# Patient Record
Sex: MALE | Race: BLACK OR AFRICAN AMERICAN | Employment: OTHER | URBAN - METROPOLITAN AREA
[De-identification: names, ages, dates, MRNs, and addresses within clinical notes are randomized per-mention and may not be internally consistent; named-entity substitution may affect disease eponyms.]

---

## 2017-01-12 ENCOUNTER — HOSPITAL ENCOUNTER (OUTPATIENT)
Dept: OTHER | Age: 67
Discharge: HOME | End: 2017-01-12
Attending: PSYCHIATRY & NEUROLOGY | Admitting: PSYCHIATRY & NEUROLOGY

## 2017-01-12 ENCOUNTER — OFFICE VISIT (OUTPATIENT)
Dept: NEUROLOGY | Age: 67
End: 2017-01-12

## 2017-01-12 VITALS
DIASTOLIC BLOOD PRESSURE: 67 MMHG | WEIGHT: 206.8 LBS | HEIGHT: 75 IN | HEART RATE: 51 BPM | SYSTOLIC BLOOD PRESSURE: 106 MMHG | BODY MASS INDEX: 25.71 KG/M2

## 2017-01-12 DIAGNOSIS — G60.9 IDIOPATHIC PERIPHERAL NEUROPATHY: Primary | ICD-10-CM

## 2017-01-12 LAB — VITAMIN B-12: 733 PG/ML (ref 211–911)

## 2017-01-12 PROCEDURE — 99204 OFFICE O/P NEW MOD 45 MIN: CPT | Performed by: PSYCHIATRY & NEUROLOGY

## 2017-01-12 RX ORDER — GABAPENTIN 300 MG/1
300 CAPSULE ORAL DAILY
COMMUNITY
End: 2018-04-30

## 2017-01-13 LAB
ALBUMIN SERPL-MCNC: 3.7 G/DL (ref 3.1–4.9)
ALPHA-1-GLOBULIN: 0.2 G/DL (ref 0.2–0.4)
ALPHA-2-GLOBULIN: 0.7 G/DL (ref 0.4–1.1)
BETA GLOBULIN: 1.1 G/DL (ref 0.9–1.6)
GAMMA GLOBULIN: 1.4 G/DL (ref 0.6–1.8)
SPE/IFE INTERPRETATION: NORMAL
TOTAL PROTEIN: 7.1 G/DL (ref 6.4–8.2)

## 2017-03-08 ENCOUNTER — OFFICE VISIT (OUTPATIENT)
Dept: ENT CLINIC | Age: 67
End: 2017-03-08

## 2017-03-08 VITALS
DIASTOLIC BLOOD PRESSURE: 60 MMHG | WEIGHT: 204 LBS | HEIGHT: 75 IN | HEART RATE: 57 BPM | BODY MASS INDEX: 25.36 KG/M2 | SYSTOLIC BLOOD PRESSURE: 107 MMHG

## 2017-03-08 DIAGNOSIS — J01.90 ACUTE RHINOSINUSITIS: Primary | ICD-10-CM

## 2017-03-08 PROCEDURE — 99214 OFFICE O/P EST MOD 30 MIN: CPT | Performed by: OTOLARYNGOLOGY

## 2017-03-08 RX ORDER — AMOXICILLIN AND CLAVULANATE POTASSIUM 875; 125 MG/1; MG/1
1 TABLET, FILM COATED ORAL 2 TIMES DAILY
Qty: 28 TABLET | Refills: 0 | Status: SHIPPED | OUTPATIENT
Start: 2017-03-08 | End: 2017-03-22

## 2017-04-07 ENCOUNTER — TELEPHONE (OUTPATIENT)
Dept: NEUROLOGY | Age: 67
End: 2017-04-07

## 2017-05-01 ENCOUNTER — TELEPHONE (OUTPATIENT)
Dept: ENT CLINIC | Age: 67
End: 2017-05-01

## 2017-05-05 ENCOUNTER — OFFICE VISIT (OUTPATIENT)
Dept: ENT CLINIC | Age: 67
End: 2017-05-05

## 2017-05-05 VITALS
HEIGHT: 75 IN | HEART RATE: 65 BPM | DIASTOLIC BLOOD PRESSURE: 63 MMHG | BODY MASS INDEX: 25.36 KG/M2 | SYSTOLIC BLOOD PRESSURE: 120 MMHG | WEIGHT: 204 LBS

## 2017-05-05 DIAGNOSIS — J01.90 ACUTE RHINOSINUSITIS: Primary | ICD-10-CM

## 2017-05-05 PROCEDURE — 99213 OFFICE O/P EST LOW 20 MIN: CPT | Performed by: OTOLARYNGOLOGY

## 2017-05-05 RX ORDER — AZITHROMYCIN 250 MG/1
TABLET, FILM COATED ORAL
Qty: 1 PACKET | Refills: 0 | Status: SHIPPED | OUTPATIENT
Start: 2017-05-13 | End: 2017-07-12

## 2017-05-05 RX ORDER — AZITHROMYCIN 250 MG/1
250 TABLET, FILM COATED ORAL DAILY
COMMUNITY
End: 2017-07-12

## 2017-07-18 ENCOUNTER — OFFICE VISIT (OUTPATIENT)
Dept: ORTHOPEDIC SURGERY | Age: 67
End: 2017-07-18

## 2017-07-18 VITALS
SYSTOLIC BLOOD PRESSURE: 119 MMHG | HEIGHT: 75 IN | WEIGHT: 199 LBS | HEART RATE: 76 BPM | DIASTOLIC BLOOD PRESSURE: 75 MMHG | TEMPERATURE: 97.6 F | BODY MASS INDEX: 24.74 KG/M2

## 2017-07-18 DIAGNOSIS — M17.12 ARTHRITIS OF LEFT KNEE: ICD-10-CM

## 2017-07-18 DIAGNOSIS — M71.22: ICD-10-CM

## 2017-07-18 DIAGNOSIS — M25.562 LEFT KNEE PAIN, UNSPECIFIED CHRONICITY: Primary | ICD-10-CM

## 2017-07-18 PROCEDURE — 99202 OFFICE O/P NEW SF 15 MIN: CPT | Performed by: PHYSICIAN ASSISTANT

## 2017-07-19 PROBLEM — M71.20: Status: ACTIVE | Noted: 2017-07-19

## 2017-07-19 PROBLEM — M17.12 ARTHRITIS OF LEFT KNEE: Status: ACTIVE | Noted: 2017-07-19

## 2017-09-29 ENCOUNTER — OFFICE VISIT (OUTPATIENT)
Dept: ENT CLINIC | Age: 67
End: 2017-09-29

## 2017-09-29 VITALS
WEIGHT: 201.6 LBS | BODY MASS INDEX: 25.07 KG/M2 | DIASTOLIC BLOOD PRESSURE: 59 MMHG | HEIGHT: 75 IN | HEART RATE: 63 BPM | SYSTOLIC BLOOD PRESSURE: 108 MMHG

## 2017-09-29 DIAGNOSIS — J32.0 CHRONIC MAXILLARY SINUSITIS: ICD-10-CM

## 2017-09-29 DIAGNOSIS — R09.82 POST-NASAL DRIP: Primary | ICD-10-CM

## 2017-09-29 PROCEDURE — 99214 OFFICE O/P EST MOD 30 MIN: CPT | Performed by: OTOLARYNGOLOGY

## 2017-09-29 RX ORDER — AZELASTINE 1 MG/ML
SPRAY, METERED NASAL
Qty: 1 BOTTLE | Refills: 2 | Status: SHIPPED | OUTPATIENT
Start: 2017-09-29 | End: 2018-04-30

## 2017-09-29 RX ORDER — FLUTICASONE PROPIONATE 50 MCG
SPRAY, SUSPENSION (ML) NASAL
Qty: 1 BOTTLE | Refills: 2 | Status: SHIPPED | OUTPATIENT
Start: 2017-09-29 | End: 2018-01-19

## 2017-09-29 RX ORDER — CEFDINIR 300 MG/1
600 CAPSULE ORAL DAILY
Qty: 42 CAPSULE | Refills: 0 | Status: SHIPPED | OUTPATIENT
Start: 2017-09-29 | End: 2017-10-20

## 2017-09-29 NOTE — PROGRESS NOTES
[address] [facility]          PCP:  Dorothy Ferrer MD      CHIEF COMPLAINT:  Chief Complaint   Patient presents with    Other     scratchy throat       HISTORY OF PRESENT ILLNESS:   Mulugeta Junior is a 73 y.o. male, here for a problem located in the nose. \"I have post nasal drainage, and a clogged nose and I have to blow it out, a lot of drainage, then my nose is cleared, and then I have to cough something out of the throat, I have to cough it up hard and it may irritate my throat and that may be where the scratchy throat comes from. \"  He denied pressure and pain in the face or sinus areas. These are not his typical sinus infection symptoms. Onset of current illness about two months ago. Previous episode in May treated with two Z-paks, it calmed it down a bit but didn't eliminate it. Since the last visit here, did well until two months ago. He has had a sinus infection once or twice a year for past three years. REVIEW OF SYSTEMS:   CONSTITUTIONAL:  Denied fever and chills. Denied unexplained weight loss. EARS, NOSE, THROAT:  Denied otorrhea, otalgia, epistaxis, nasal dyspnea, rhinorrhea, sore throat and hoarseness. PAST MEDICAL HISTORY:  Past Medical History:   Diagnosis Date    CAD (coronary artery disease)     Head pain cephalgia 1/21/2014    Hyperlipidemia     Hypertension     Perennial allergic rhinitis 2/11/2015    Pre-diabetes         Past Surgical History:   Procedure Laterality Date    CARDIAC SURGERY      Stents    CHOLECYSTECTOMY      COLONOSCOPY      CORONARY ANGIOPLASTY WITH STENT PLACEMENT      KNEE ARTHROSCOPY      right    ROTATOR CUFF REPAIR      right, and left    TONSILLECTOMY            Current Outpatient Prescriptions   Medication Sig Dispense Refill    fluticasone (FLONASE) 50 MCG/ACT nasal spray 2 sprays in each nostril daily, 15 minutes after morning dose of Astelin. Dipika Vasquez  1 Bottle 2    azelastine (ASTELIN) 0.1 % nasal spray Use 2 sprays Mery Mckinney / Carlos A Delarosa / Arnie Chavez / PROCEDURES:       Alyssa Doss was seen today for other. Diagnoses and all orders for this visit:    Post-nasal drip  -     fluticasone (FLONASE) 50 MCG/ACT nasal spray; 2 sprays in each nostril daily, 15 minutes after morning dose of Astelin. .  -     azelastine (ASTELIN) 0.1 % nasal spray; Use 2 sprays in each nostril 2 times daily. Use fluticasone 15 minutes after the morning dose of astelin. -     CT Sinus WO Contrast; Future    Chronic maxillary sinusitis  -     fluticasone (FLONASE) 50 MCG/ACT nasal spray; 2 sprays in each nostril daily, 15 minutes after morning dose of Astelin. Lorenso Frankel -     cefdinir (OMNICEF) 300 MG capsule; Take 2 capsules by mouth daily for 21 days  -     CT Sinus WO Contrast; Future         RECOMMENDATIONS / PLAN:    1. See Patient Instructions. 2. Return in about 1 month (around 10/29/2017) for recheck & diagnostic nasal endoscopy after CT scan.

## 2017-09-29 NOTE — MR AVS SNAPSHOT
people who are more muscular. If your body fat is high you can improve your BMI by decreasing your calorie intake and becoming more physically active. Learn more at: [employer]          Instructions    HOME INSTRUCTIONS - RHINOSINUSITIS CARE  · Take Probiotic as prescribed while you are taking antibiotics, to prevent diarrhea, stomach upset, pseudomembranous colitis, and C. difficile diarrhea. This may be obtained at your pharmacy or health food store. Alternatively, you may eat one cup of yogurt with active or live cultures twice daily while taking the antibiotic. Continue for two to three days after completion of the antibiotic. · Use a 12 hour decongestant spray, oxymetazoline (e.g. Afrin, Duration, 4-Way). Spray each nostril twice three times a day for three days, then two times a day for 2 days, and then stop for two days and then repeat the cycle once. · Take one Mucinex (blue box) maximum strength tablet 1200 mg every 12 hours, daily for the next 21 days. · Use a saline nasal/sinus irrigation system, e.g. NeilMed sinus rinse, twice daily to help to clear secretions and drainage. Use distilled water; DO NOT USE TAP WATER! This is because of the possibility of amoeba or other microorganisms in tap water, which can result in a fatal disease. Alternatively, you could use a blue bulb syringe and solution of 1/4 tsp of table salt in 8 ounces (one cup or 240 ml) of distilled water. · Use Flonase (fluticasone) and Astelin (azelastine) as prescribed daily. · It is important to take all your medications as prescribed. Please continue your antibiotics as prescribed.     · Please call the office if your condition worsens or if symptoms persist after treatment is completed.        ===================================================================      ADVERSE AND SIDE EFFECTS OF MEDICATIONS: Please be aware of the following possible adverse reactions, side effects, and complications of the following medications, including, but not limited to: allergic reaction, interactions with other medications, nausea, headache, diarrhea, persistent symptoms, failure to improve, and the following:     Omnicef (antibiotic):  diarrhea, colitis (severe infection and inflammation of the large intestine), pseudomembranous colitis (severe infection and inflammation of the colon, usually due to C. difficile bacteria)  yeast or fungal  infections, Nieto-Bravo syndrome (very rare necrotic skin reaction with peeling of skin, blisters and arthritis), persistent symptoms/infection, and failure to improve. Fluticasone:  nosebleed, burning sensation, atrophy of nasal mucosa, septal ulceration or perforation, nasal irritation, nasal yeast infection,  drowsiness, bad taste. Azelastine/Astelin:  Drowsiness, bad taste.    ~~~>>> Also please read all information given to you by the pharmacist.                 Today's Medication Changes          These changes are accurate as of: 9/29/17  4:18 PM.  If you have any questions, ask your nurse or doctor. START taking these medications           azelastine 0.1 % nasal spray   Commonly known as:  ASTELIN   Instructions:  Use 2 sprays in each nostril 2 times daily. Use fluticasone 15 minutes after the morning dose of astelin. Quantity:  1 Bottle   Refills:  2   Started by:  Henry Shone, MD       cefdinir 300 MG capsule   Commonly known as:  OMNICEF   Instructions: Take 2 capsules by mouth daily for 21 days   Quantity:  42 capsule   Refills:  0   Started by:  Henry Shone, MD       fluticasone 50 MCG/ACT nasal spray   Commonly known as:  FLONASE   Instructions:  2 sprays in each nostril daily, 15 minutes after morning dose of Astelin. .   Quantity:  1 Bottle   Refills:  2   Started by:  Henry Shone, MD            Where to Get Your Medications Hepatitis C screening is recommended for all adults regardless of risk factors born between [facility] at least once (lifetime) who have never been tested. 1950    HIV screening is recommended for all people regardless of risk factors  aged 15-65 years at least once (lifetime) who have never been HIV tested. 10/29/1966    Tetanus Combination Vaccine (1 - Tdap) 10/29/1970    Colonoscopy 10/29/2001    Zoster Vaccine 10/29/2011    Diabetes Screening 4/12/2016    Pneumococcal Vaccines (two) for all adults aged 72 and over (1 of 2 - PCV13) 10/29/2016    Yearly Flu Vaccine (1) 9/1/2017    Cholesterol Screening 10/12/2018            MyChart Signup           [employer] allows you to send messages to your doctor, view your test results, renew your prescriptions, schedule appointments, view visit notes, and more. How Do I Sign Up? 1. In your Internet browser, go to [email or web address]  2. Click on the Sign Up Now link in the Sign In box. You will see the New Member Sign Up page. 3. Enter your [employer] Access Code exactly as it appears below. You will not need to use this code after youve completed the sign-up process. If you do not sign up before the expiration date, you must request a new code. [employer] Access Code: [email or web address]  Expires: 11/28/2017  4:18 PM    4. Enter your Social Security Number ([id number]) and Date of Birth (mm/dd/yyyy) as indicated and click Submit. You will be taken to the next sign-up page. 5. Create a [employer] ID. This will be your [employer] login ID and cannot be changed, so think of one that is secure and easy to remember. 6. Create a [employer] password. You can change your password at any time. 7. Enter your Password Reset Question and Answer. This can be used at a later time if you forget your password. 8. Enter your e-mail address. You will receive e-mail notification when new information is available in [address] 9. Click Sign Up. You can now view your medical record. Additional Information  If you have questions, please contact the physician practice where you receive care. Remember, [employer] is NOT to be used for urgent needs. For medical emergencies, dial 911. For questions regarding your [employer] account call [phone]. If you have a clinical question, please call your doctor's office.

## 2017-10-30 ENCOUNTER — OFFICE VISIT (OUTPATIENT)
Dept: ENT CLINIC | Age: 67
End: 2017-10-30

## 2017-10-30 VITALS
HEART RATE: 63 BPM | SYSTOLIC BLOOD PRESSURE: 109 MMHG | DIASTOLIC BLOOD PRESSURE: 67 MMHG | WEIGHT: 200 LBS | BODY MASS INDEX: 25 KG/M2

## 2017-10-30 DIAGNOSIS — J32.0 CHRONIC MAXILLARY SINUSITIS: ICD-10-CM

## 2017-10-30 DIAGNOSIS — R09.82 POST-NASAL DRIP: Primary | ICD-10-CM

## 2017-10-30 PROCEDURE — 31231 NASAL ENDOSCOPY DX: CPT | Performed by: OTOLARYNGOLOGY

## 2017-10-30 NOTE — PROGRESS NOTES
PROCEDURE - Diagnostic Nasal Endoscopy, bilateral  [CPT 50425]    INFORMED CONSENT    Risks, benefits, and alternatives of diagnostic nasal endoscopy were explained to the patient. Specific mention was made of the risk of nosebleed, drainage, throat numbness with difficulty swallowing, and pain from the procedure. The patient gave oral consent to proceed. INDICATIONS/HISTORY  Chronic sinusitis. Feels improved with nasal sprays. FINDINGS    The nasal septum was mildly deviated to the right. Middle and inferior turbinates appeared to be normal bilaterally. There was no purulent exudate, polyp, mass, or lesion appreciated in either nasal cavity or middle meatus. The olfactory clefts appeared to be clear. The middle meatus appeared to be clear and patent bilaterally. The sphenoethmoid recesses appeared to be clear and patent. The remainder of the right and left nasal cavities appeared to be normal.         DESCRIPTION OF PROCEDURE    The nasal cavities were decongested and anesthetized with a mixture of equal parts of 0.05% oxymetazoline solution and 4% lidocaine solution by nasal sprayer. This was repeated after 5 minutes. After an appropriate elapse of time, the 4.4 mm 30 degree rigid nasal telescope was inserted into the left nasal cavity. Endoscopy of the inferior and middle meatus and nasal cavity to the posterior choana was performed with the above findings. The procedure was repeated on the right side with the above findings. The patient tolerated the procedure well with no evidence of complication. Instructions were given to avoid eating or drinking for 1 hour. IMPRESSION / Josh Morfin / Richmond Guo / PROCEDURES:       Ninfa Gusman was seen today for nasal congestion, nose problem and sinusitis. Diagnoses and all orders for this visit:    Post-nasal drip    Chronic maxillary sinusitis         RECOMMENDATIONS / PLAN:             1. See Patient Instructions.   2. Return in about 3

## 2017-11-07 ENCOUNTER — HOSPITAL ENCOUNTER (OUTPATIENT)
Dept: CT IMAGING | Age: 67
Discharge: HOME | End: 2017-11-07
Attending: OTOLARYNGOLOGY | Admitting: OTOLARYNGOLOGY

## 2017-11-07 DIAGNOSIS — R09.82 POST-NASAL DRIP: ICD-10-CM

## 2017-11-07 DIAGNOSIS — R09.82 POSTNASAL DRIP: ICD-10-CM

## 2017-11-07 DIAGNOSIS — J32.0 CHRONIC MAXILLARY SINUSITIS: ICD-10-CM

## 2018-01-19 ENCOUNTER — OFFICE VISIT (OUTPATIENT)
Dept: ENT CLINIC | Age: 68
End: 2018-01-19

## 2018-01-19 VITALS
BODY MASS INDEX: 25.36 KG/M2 | WEIGHT: 204 LBS | DIASTOLIC BLOOD PRESSURE: 65 MMHG | SYSTOLIC BLOOD PRESSURE: 109 MMHG | HEIGHT: 75 IN | HEART RATE: 57 BPM

## 2018-01-19 DIAGNOSIS — J01.90 ACUTE RHINOSINUSITIS: Primary | ICD-10-CM

## 2018-01-19 DIAGNOSIS — R09.82 POST-NASAL DRIP: ICD-10-CM

## 2018-01-19 PROCEDURE — 1036F TOBACCO NON-USER: CPT | Performed by: OTOLARYNGOLOGY

## 2018-01-19 PROCEDURE — G8427 DOCREV CUR MEDS BY ELIG CLIN: HCPCS | Performed by: OTOLARYNGOLOGY

## 2018-01-19 PROCEDURE — G8598 ASA/ANTIPLAT THER USED: HCPCS | Performed by: OTOLARYNGOLOGY

## 2018-01-19 PROCEDURE — 4040F PNEUMOC VAC/ADMIN/RCVD: CPT | Performed by: OTOLARYNGOLOGY

## 2018-01-19 PROCEDURE — 99214 OFFICE O/P EST MOD 30 MIN: CPT | Performed by: OTOLARYNGOLOGY

## 2018-01-19 PROCEDURE — 3017F COLORECTAL CA SCREEN DOC REV: CPT | Performed by: OTOLARYNGOLOGY

## 2018-01-19 PROCEDURE — G8484 FLU IMMUNIZE NO ADMIN: HCPCS | Performed by: OTOLARYNGOLOGY

## 2018-01-19 PROCEDURE — 1123F ACP DISCUSS/DSCN MKR DOCD: CPT | Performed by: OTOLARYNGOLOGY

## 2018-01-19 PROCEDURE — G8419 CALC BMI OUT NRM PARAM NOF/U: HCPCS | Performed by: OTOLARYNGOLOGY

## 2018-01-19 NOTE — PATIENT INSTRUCTIONS
SINUS RINSE  Increase your regular nasal-sinus rinse to once or twice daily (eg. NeilMed sinus rinse, OTC) using distilled water; DO NOT USE TAP WATER! This is because of the possibility of ameoba or other microorganisms in tap water, which can result in a fatal disease. Alternatively, you could use a blue bulb syringe and solution of 1/4 tsp of table salt in 8 ounces (one cup or 240 ml) of distilled water. HOME INSTRUCTIONS - RHINOSINUSITIS CARE  · Take Probiotic while you are taking antibiotics, to prevent diarrhea, stomach upset, pseudomembranous colitis, and C. difficile diarrhea. This may be obtained at your pharmacy or health food store. Alternatively, you may eat one cup of yogurt with active or live cultures twice daily while taking the antibiotic. Continue for two to three days after completion of the antibiotic. · Use a 12 hour decongestant spray, oxymetazoline (e.g. Afrin, Duration, 4-Way). Spray each nostril twice three times a day for three days, then two times a day for 2 days, and then stop for two days and then repeat the cycle once. · Take one Mucinex-D (red orange box) maximum strength tablet each morning and one Mucinex (blue box) maximum strength tablet each evening, about 12 hours later, daily for the next ten days. · A steam inhaler mask device may be helpful. These can be purchased at your pharmacy. · Use a saline nasal/sinus irrigation system, e.g. NeilMed sinus rinse, twice daily to help to clear secretions and drainage. Use distilled water; DO NOT USE TAP WATER! This is because of the possibility of amoeba or other microorganisms in tap water, which can result in a fatal disease. Alternatively, you could use a blue bulb syringe and solution of 1/4 tsp of table salt in 8 ounces (one cup or 240 ml) of distilled water. · Use fluticasone 2 sprays in each nostril once daily. · Use your astelin twice daily.   · It may take several days to several weeks for your sinusitis to clear up. It is important to take all your medications as prescribed. Please continue your antibiotics as prescribed. · Please call the office if your condition worsens or if symptoms persist after treatment is completed.          ===================================================================      ADVERSE AND SIDE EFFECTS OF MEDICATIONS:    Please be aware of the following possible adverse reactions, side effects, and complications of the following medications, including, but not limited to: allergic reaction, interactions with other medications, nausea, headache, diarrhea, persistent symptoms, failure to improve, and the following:     Omnicef (antibiotic):  diarrhea, colitis (severe infection and inflammation of the large intestine), pseudomembranous colitis (severe infection and inflammation of the colon, usually due to C. difficile bacteria)  yeast or fungal  infections, Nieto-Bravo syndrome (very rare necrotic skin reaction with peeling of skin, blisters and arthritis), persistent symptoms/infection, and failure to improve.      Fluticasone:  nosebleed, burning sensation, atrophy of nasal mucosa, septal ulceration or perforation, nasal irritation, nasal yeast infection,  drowsiness, bad taste.      ~~~>>> Also please read all information given to you by the pharmacist.

## 2018-01-22 ENCOUNTER — TELEPHONE (OUTPATIENT)
Dept: ENT CLINIC | Age: 68
End: 2018-01-22

## 2018-01-22 NOTE — TELEPHONE ENCOUNTER
The patient states that he was seen in the office and prescribed an antibiotic but the pharmacy did not receive it. Please advise.

## 2018-01-23 RX ORDER — AZITHROMYCIN 250 MG/1
TABLET, FILM COATED ORAL
Qty: 1 PACKET | Refills: 0 | Status: SHIPPED | OUTPATIENT
Start: 2018-01-23 | End: 2018-04-30

## 2018-01-23 NOTE — TELEPHONE ENCOUNTER
No.  But we can try a Z-reji since he had symptoms, but no real findings of sinus infection. E-script sent. Please notify patient.

## 2018-04-30 ENCOUNTER — OFFICE VISIT (OUTPATIENT)
Dept: ENT CLINIC | Age: 68
End: 2018-04-30

## 2018-04-30 VITALS — HEART RATE: 51 BPM | SYSTOLIC BLOOD PRESSURE: 134 MMHG | DIASTOLIC BLOOD PRESSURE: 74 MMHG

## 2018-04-30 DIAGNOSIS — J30.1 CHRONIC SEASONAL ALLERGIC RHINITIS DUE TO POLLEN: ICD-10-CM

## 2018-04-30 DIAGNOSIS — R20.8 BURNING SENSATION OF NOSE: Primary | ICD-10-CM

## 2018-04-30 DIAGNOSIS — R09.82 POST-NASAL DRIP: ICD-10-CM

## 2018-04-30 PROCEDURE — 4040F PNEUMOC VAC/ADMIN/RCVD: CPT | Performed by: OTOLARYNGOLOGY

## 2018-04-30 PROCEDURE — 1123F ACP DISCUSS/DSCN MKR DOCD: CPT | Performed by: OTOLARYNGOLOGY

## 2018-04-30 PROCEDURE — G8417 CALC BMI ABV UP PARAM F/U: HCPCS | Performed by: OTOLARYNGOLOGY

## 2018-04-30 PROCEDURE — G8427 DOCREV CUR MEDS BY ELIG CLIN: HCPCS | Performed by: OTOLARYNGOLOGY

## 2018-04-30 PROCEDURE — 3017F COLORECTAL CA SCREEN DOC REV: CPT | Performed by: OTOLARYNGOLOGY

## 2018-04-30 PROCEDURE — 99214 OFFICE O/P EST MOD 30 MIN: CPT | Performed by: OTOLARYNGOLOGY

## 2018-04-30 PROCEDURE — 1036F TOBACCO NON-USER: CPT | Performed by: OTOLARYNGOLOGY

## 2018-04-30 PROCEDURE — G8598 ASA/ANTIPLAT THER USED: HCPCS | Performed by: OTOLARYNGOLOGY

## 2018-04-30 RX ORDER — FEXOFENADINE HCL 180 MG/1
180 TABLET ORAL DAILY
Qty: 30 TABLET | Refills: 1 | Status: SHIPPED | OUTPATIENT
Start: 2018-04-30 | End: 2018-06-11

## 2018-04-30 RX ORDER — AZELASTINE HYDROCHLORIDE, FLUTICASONE PROPIONATE 137; 50 UG/1; UG/1
1 SPRAY, METERED NASAL 2 TIMES DAILY
Qty: 1 BOTTLE | Refills: 2 | Status: SHIPPED | OUTPATIENT
Start: 2018-04-30 | End: 2018-06-11

## 2018-05-11 ENCOUNTER — TELEPHONE (OUTPATIENT)
Dept: ENT CLINIC | Age: 68
End: 2018-05-11

## 2018-05-11 DIAGNOSIS — J32.0 CHRONIC MAXILLARY SINUSITIS: ICD-10-CM

## 2018-05-11 DIAGNOSIS — J30.1 CHRONIC SEASONAL ALLERGIC RHINITIS DUE TO POLLEN: Primary | ICD-10-CM

## 2018-05-11 DIAGNOSIS — R20.8 BURNING SENSATION OF NOSE: ICD-10-CM

## 2018-05-11 DIAGNOSIS — R09.82 POST-NASAL DRIP: ICD-10-CM

## 2018-05-14 RX ORDER — AZELASTINE 1 MG/ML
SPRAY, METERED NASAL
Qty: 1 BOTTLE | Refills: 2 | Status: SHIPPED | OUTPATIENT
Start: 2018-05-14 | End: 2018-06-11 | Stop reason: SDUPTHER

## 2018-05-14 RX ORDER — FLUTICASONE PROPIONATE 50 MCG
SPRAY, SUSPENSION (ML) NASAL
Qty: 1 BOTTLE | Refills: 2 | Status: SHIPPED | OUTPATIENT
Start: 2018-05-14 | End: 2018-06-11 | Stop reason: SDUPTHER

## 2018-06-11 ENCOUNTER — OFFICE VISIT (OUTPATIENT)
Dept: ENT CLINIC | Age: 68
End: 2018-06-11

## 2018-06-11 VITALS — HEART RATE: 56 BPM | SYSTOLIC BLOOD PRESSURE: 121 MMHG | DIASTOLIC BLOOD PRESSURE: 72 MMHG

## 2018-06-11 DIAGNOSIS — R09.82 POST-NASAL DRIP: ICD-10-CM

## 2018-06-11 DIAGNOSIS — J32.0 CHRONIC MAXILLARY SINUSITIS: ICD-10-CM

## 2018-06-11 DIAGNOSIS — J30.1 CHRONIC SEASONAL ALLERGIC RHINITIS DUE TO POLLEN: Primary | ICD-10-CM

## 2018-06-11 PROCEDURE — 1036F TOBACCO NON-USER: CPT | Performed by: OTOLARYNGOLOGY

## 2018-06-11 PROCEDURE — 4040F PNEUMOC VAC/ADMIN/RCVD: CPT | Performed by: OTOLARYNGOLOGY

## 2018-06-11 PROCEDURE — G8598 ASA/ANTIPLAT THER USED: HCPCS | Performed by: OTOLARYNGOLOGY

## 2018-06-11 PROCEDURE — G8427 DOCREV CUR MEDS BY ELIG CLIN: HCPCS | Performed by: OTOLARYNGOLOGY

## 2018-06-11 PROCEDURE — 3017F COLORECTAL CA SCREEN DOC REV: CPT | Performed by: OTOLARYNGOLOGY

## 2018-06-11 PROCEDURE — 99213 OFFICE O/P EST LOW 20 MIN: CPT | Performed by: OTOLARYNGOLOGY

## 2018-06-11 PROCEDURE — 1123F ACP DISCUSS/DSCN MKR DOCD: CPT | Performed by: OTOLARYNGOLOGY

## 2018-06-11 PROCEDURE — G8417 CALC BMI ABV UP PARAM F/U: HCPCS | Performed by: OTOLARYNGOLOGY

## 2018-06-11 RX ORDER — AZELASTINE HYDROCHLORIDE, FLUTICASONE PROPIONATE 137; 50 UG/1; UG/1
SPRAY, METERED NASAL
COMMUNITY
End: 2018-06-11 | Stop reason: SDUPTHER

## 2018-06-11 RX ORDER — HYDROCHLOROTHIAZIDE 25 MG/1
25 TABLET ORAL DAILY
COMMUNITY

## 2018-06-11 RX ORDER — AZELASTINE HYDROCHLORIDE, FLUTICASONE PROPIONATE 137; 50 UG/1; UG/1
1 SPRAY, METERED NASAL 2 TIMES DAILY
Qty: 1 BOTTLE | Refills: 5 | Status: SHIPPED | OUTPATIENT
Start: 2018-06-11 | End: 2019-03-19 | Stop reason: SDUPTHER

## 2018-06-11 RX ORDER — FEXOFENADINE HCL 180 MG/1
180 TABLET ORAL DAILY
Qty: 45 TABLET | Refills: 0 | Status: SHIPPED | COMMUNITY
Start: 2018-06-11 | End: 2019-03-19 | Stop reason: SDUPTHER

## 2018-06-29 ENCOUNTER — TELEPHONE (OUTPATIENT)
Dept: ENT CLINIC | Age: 68
End: 2018-06-29

## 2018-07-18 ENCOUNTER — OFFICE VISIT (OUTPATIENT)
Dept: ENT CLINIC | Age: 68
End: 2018-07-18

## 2018-07-18 VITALS
SYSTOLIC BLOOD PRESSURE: 114 MMHG | DIASTOLIC BLOOD PRESSURE: 67 MMHG | BODY MASS INDEX: 25.49 KG/M2 | HEIGHT: 75 IN | HEART RATE: 59 BPM | WEIGHT: 205 LBS

## 2018-07-18 DIAGNOSIS — J30.1 CHRONIC SEASONAL ALLERGIC RHINITIS DUE TO POLLEN: ICD-10-CM

## 2018-07-18 DIAGNOSIS — J02.9 ACUTE PHARYNGITIS, UNSPECIFIED ETIOLOGY: Primary | ICD-10-CM

## 2018-07-18 PROCEDURE — G8427 DOCREV CUR MEDS BY ELIG CLIN: HCPCS | Performed by: OTOLARYNGOLOGY

## 2018-07-18 PROCEDURE — 1123F ACP DISCUSS/DSCN MKR DOCD: CPT | Performed by: OTOLARYNGOLOGY

## 2018-07-18 PROCEDURE — 1101F PT FALLS ASSESS-DOCD LE1/YR: CPT | Performed by: OTOLARYNGOLOGY

## 2018-07-18 PROCEDURE — G8417 CALC BMI ABV UP PARAM F/U: HCPCS | Performed by: OTOLARYNGOLOGY

## 2018-07-18 PROCEDURE — 1036F TOBACCO NON-USER: CPT | Performed by: OTOLARYNGOLOGY

## 2018-07-18 PROCEDURE — 3017F COLORECTAL CA SCREEN DOC REV: CPT | Performed by: OTOLARYNGOLOGY

## 2018-07-18 PROCEDURE — G8598 ASA/ANTIPLAT THER USED: HCPCS | Performed by: OTOLARYNGOLOGY

## 2018-07-18 PROCEDURE — 99214 OFFICE O/P EST MOD 30 MIN: CPT | Performed by: OTOLARYNGOLOGY

## 2018-07-18 PROCEDURE — 4040F PNEUMOC VAC/ADMIN/RCVD: CPT | Performed by: OTOLARYNGOLOGY

## 2018-07-18 RX ORDER — AMOXICILLIN 875 MG/1
875 TABLET, COATED ORAL 2 TIMES DAILY
Qty: 20 TABLET | Refills: 0 | Status: SHIPPED | OUTPATIENT
Start: 2018-07-18 | End: 2018-07-28

## 2018-07-18 NOTE — PROGRESS NOTES
[address] [facility]       PCP:  Ivet Corbin MD      CHIEF COMPLAINT:  Chief Complaint   Patient presents with    Pharyngitis       HISTORY OF PRESENT ILLNESS:   Светлана Raya is a 78 y.o. male here today for evaluation of a problem located in the throat. He c/o throat hurting for about 3 weeks, bilateral, with no unilateral pain. The severity of the pain is at 4-5 on a 1-10 scale. \"It hurts more when I drink anything. \"  It occurs off and on, comes and goes for 5-10 years. It has been occurring \"2-3 times a year\" and lasts for \"about 5 - 6 weeks if it's not infection and I don't get medication. With medication, it goes away pretty fast.\"  \"I think it has something to do with the sinusitis. \"  He gets a sinus infection \"about once a year. \"  He has rhinorrhea, \"a lot, all the time\", not only with sore throat. REVIEW OF SYSTEMS:   CONSTITUTIONAL:  No fever no weight loss  EARS, NOSE, THROAT:  Denied otorrhea, otalgia, nasal dyspnea, sore throat and chronic hoarseness. ALLERGIC:  (+) seasonal allergies. Current Outpatient Prescriptions   Medication Sig Dispense Refill    amoxicillin (AMOXIL) 875 MG tablet Take 1 tablet by mouth 2 times daily for 10 days 20 tablet 0    hydrochlorothiazide (HYDRODIURIL) 25 MG tablet Take 25 mg by mouth      fexofenadine (ALLEGRA) 180 MG tablet Take 1 tablet by mouth daily 45 tablet 0    Azelastine-Fluticasone (DYMISTA) 137-50 MCG/ACT SUSP 1 spray by Each Nare route 2 times daily 1 Bottle 5    metFORMIN (GLUCOPHAGE) 500 MG tablet Take 500 mg by mouth 2 times daily (with meals)      simvastatin (ZOCOR) 40 MG tablet Take 40 mg by mouth nightly.  metoprolol (TOPROL-XL) 25 MG XL tablet Take 25 mg by mouth daily.  lisinopril (PRINIVIL;ZESTRIL) 10 MG tablet Take 20 mg by mouth daily.  aspirin 81 MG EC tablet Take 81 mg by mouth daily.  Psyllium (METAMUCIL PO) Take  by mouth daily.        No current facility-administered medications for this visit. EXAMINATION:      VITALS SIGNS reviewed. Vitals:    07/18/18 1737   BP: 114/67   Pulse: 59      GENERAL APPEARANCE:  Well developed, well nourished, no apparent distress. ABILITY TO COMMUNICATE/QUALITY OF VOICE:  Communicated without difficulty. Normal voice. No hot potato voice. No hoarseness. EXTERNAL EAR/NOSE:  Normal pinnae and mastoids. Normal external nose. EARS, OTOSCOPY: The TMs and EACs appeared to be normal bilaterally. NOSE:  The nasal septum was midline. The inferior turbinates were normal.  The nasal mucosa and secretions were normal.  No pus or polyps were seen. SINUSES:  The maxillary and frontal sinuses were nontender, bilaterally. LIPS, TEETH, AND GUMS:   Normal.     (+) OROPHARYNX/ORAL CAVITY:  mild erythema of post OP wall and lateral pharyngea bands. Otherwise, the oral mucosa, hard and soft palates, tongue, and pharynx were normal.      NECK:  No masses or tenderness. The laryngeal cartilages and hyoid bone were normal.  No abnormal appearance, asymmetry or abnormal tracheal position. PALPATION OF LYMPH NODES, CERVICAL, FACIAL AND SUPRACLAVICULAR:  No lymphadenopathy. ORIENTATION TO TIME, PLACE AND PERSON: Oriented x 3. MOOD AND AFFECT: Normal.  No evidence of depression, anxiety or agitation. IMPRESSION / Juliet Friday / Kenya Gross / PROCEDURES:       Светлана Raya was seen today for pharyngitis. Diagnoses and all orders for this visit:    Acute pharyngitis, unspecified etiology  -     amoxicillin (AMOXIL) 875 MG tablet; Take 1 tablet by mouth 2 times daily for 10 days    Chronic seasonal allergic rhinitis due to pollen         RECOMMENDATIONS / PLAN:    1. Continue allergy medications as needed. Return in about 6 months (around 1/18/2019) for recheck/follow-up, and sooner if condition worsens.

## 2019-03-19 ENCOUNTER — OFFICE VISIT (OUTPATIENT)
Dept: ENT CLINIC | Age: 69
End: 2019-03-19
Payer: MEDICARE

## 2019-03-19 VITALS
DIASTOLIC BLOOD PRESSURE: 68 MMHG | BODY MASS INDEX: 26.11 KG/M2 | WEIGHT: 210 LBS | HEIGHT: 75 IN | HEART RATE: 61 BPM | SYSTOLIC BLOOD PRESSURE: 113 MMHG

## 2019-03-19 DIAGNOSIS — J30.1 CHRONIC SEASONAL ALLERGIC RHINITIS DUE TO POLLEN: ICD-10-CM

## 2019-03-19 DIAGNOSIS — R09.82 POST-NASAL DRIP: ICD-10-CM

## 2019-03-19 DIAGNOSIS — J30.1 SEASONAL ALLERGIC RHINITIS DUE TO POLLEN: Primary | ICD-10-CM

## 2019-03-19 PROCEDURE — 3017F COLORECTAL CA SCREEN DOC REV: CPT | Performed by: OTOLARYNGOLOGY

## 2019-03-19 PROCEDURE — G8417 CALC BMI ABV UP PARAM F/U: HCPCS | Performed by: OTOLARYNGOLOGY

## 2019-03-19 PROCEDURE — 99213 OFFICE O/P EST LOW 20 MIN: CPT | Performed by: OTOLARYNGOLOGY

## 2019-03-19 PROCEDURE — 1036F TOBACCO NON-USER: CPT | Performed by: OTOLARYNGOLOGY

## 2019-03-19 PROCEDURE — 1123F ACP DISCUSS/DSCN MKR DOCD: CPT | Performed by: OTOLARYNGOLOGY

## 2019-03-19 PROCEDURE — 4040F PNEUMOC VAC/ADMIN/RCVD: CPT | Performed by: OTOLARYNGOLOGY

## 2019-03-19 PROCEDURE — G8427 DOCREV CUR MEDS BY ELIG CLIN: HCPCS | Performed by: OTOLARYNGOLOGY

## 2019-03-19 PROCEDURE — G8484 FLU IMMUNIZE NO ADMIN: HCPCS | Performed by: OTOLARYNGOLOGY

## 2019-03-19 PROCEDURE — 1101F PT FALLS ASSESS-DOCD LE1/YR: CPT | Performed by: OTOLARYNGOLOGY

## 2019-03-19 PROCEDURE — G8598 ASA/ANTIPLAT THER USED: HCPCS | Performed by: OTOLARYNGOLOGY

## 2019-03-19 RX ORDER — AZELASTINE HYDROCHLORIDE, FLUTICASONE PROPIONATE 137; 50 UG/1; UG/1
1 SPRAY, METERED NASAL 2 TIMES DAILY
Qty: 1 BOTTLE | Refills: 5 | Status: SHIPPED | OUTPATIENT
Start: 2019-03-19 | End: 2020-01-21

## 2019-03-19 RX ORDER — FEXOFENADINE HCL 180 MG/1
180 TABLET ORAL DAILY
Qty: 45 TABLET | Refills: 0 | Status: SHIPPED | OUTPATIENT
Start: 2019-03-19 | End: 2019-05-03 | Stop reason: SDUPTHER

## 2020-01-21 ENCOUNTER — OFFICE VISIT (OUTPATIENT)
Dept: ENT CLINIC | Age: 70
End: 2020-01-21
Payer: MEDICARE

## 2020-01-21 VITALS
DIASTOLIC BLOOD PRESSURE: 66 MMHG | WEIGHT: 197.1 LBS | SYSTOLIC BLOOD PRESSURE: 111 MMHG | BODY MASS INDEX: 24.51 KG/M2 | HEART RATE: 61 BPM | HEIGHT: 75 IN

## 2020-01-21 PROBLEM — M17.12 PRIMARY OSTEOARTHRITIS OF LEFT KNEE: Status: ACTIVE | Noted: 2017-09-06

## 2020-01-21 PROBLEM — N20.0 NEPHROLITHIASIS: Status: ACTIVE | Noted: 2018-06-07

## 2020-01-21 PROBLEM — S96.911A: Status: ACTIVE | Noted: 2019-07-31

## 2020-01-21 PROBLEM — G89.29 CHRONIC FOOT PAIN, RIGHT: Status: ACTIVE | Noted: 2019-07-31

## 2020-01-21 PROBLEM — K59.01 SLOW TRANSIT CONSTIPATION: Status: ACTIVE | Noted: 2017-01-17

## 2020-01-21 PROBLEM — M23.207: Status: ACTIVE | Noted: 2018-08-17

## 2020-01-21 PROBLEM — Z12.5 SCREENING FOR PROSTATE CANCER: Status: ACTIVE | Noted: 2019-02-19

## 2020-01-21 PROBLEM — S83.242A ACUTE MEDIAL MENISCUS TEAR OF LEFT KNEE: Status: ACTIVE | Noted: 2017-11-29
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
## 2020-01-21 PROBLEM — R31.29 OTHER MICROSCOPIC HEMATURIA: Status: ACTIVE | Noted: 2018-06-07

## 2020-01-21 PROBLEM — R63.4 UNINTENTIONAL WEIGHT LOSS: Status: ACTIVE | Noted: 2018-08-17

## 2020-01-21 PROBLEM — K29.60 BILIARY GASTRITIS: Status: ACTIVE | Noted: 2018-08-17

## 2020-01-21 PROBLEM — H83.02 ACUTE LABYRINTHITIS, LEFT: Status: ACTIVE | Noted: 2017-05-31

## 2020-01-21 PROBLEM — M47.812 CERVICAL SPONDYLOSIS: Status: ACTIVE | Noted: 2020-01-21

## 2020-01-21 PROBLEM — M79.671 CHRONIC FOOT PAIN, RIGHT: Status: ACTIVE | Noted: 2019-07-31

## 2020-01-21 PROBLEM — Z87.448 HISTORY OF URETHRAL STRICTURE: Status: ACTIVE | Noted: 2019-04-11

## 2020-01-21 PROCEDURE — 4040F PNEUMOC VAC/ADMIN/RCVD: CPT | Performed by: OTOLARYNGOLOGY

## 2020-01-21 PROCEDURE — G8427 DOCREV CUR MEDS BY ELIG CLIN: HCPCS | Performed by: OTOLARYNGOLOGY

## 2020-01-21 PROCEDURE — 1036F TOBACCO NON-USER: CPT | Performed by: OTOLARYNGOLOGY

## 2020-01-21 PROCEDURE — 99214 OFFICE O/P EST MOD 30 MIN: CPT | Performed by: OTOLARYNGOLOGY

## 2020-01-21 PROCEDURE — 1123F ACP DISCUSS/DSCN MKR DOCD: CPT | Performed by: OTOLARYNGOLOGY

## 2020-01-21 PROCEDURE — G8484 FLU IMMUNIZE NO ADMIN: HCPCS | Performed by: OTOLARYNGOLOGY

## 2020-01-21 PROCEDURE — 3017F COLORECTAL CA SCREEN DOC REV: CPT | Performed by: OTOLARYNGOLOGY

## 2020-01-21 PROCEDURE — G8420 CALC BMI NORM PARAMETERS: HCPCS | Performed by: OTOLARYNGOLOGY

## 2020-01-21 RX ORDER — METHYLPREDNISOLONE 4 MG/1
TABLET ORAL
Qty: 1 KIT | Refills: 0 | Status: SHIPPED | OUTPATIENT
Start: 2020-01-21 | End: 2020-08-19 | Stop reason: ALTCHOICE

## 2020-01-21 RX ORDER — FLUTICASONE PROPIONATE 50 MCG
SPRAY, SUSPENSION (ML) NASAL
Qty: 1 BOTTLE | Refills: 2 | Status: SHIPPED | OUTPATIENT
Start: 2020-01-21 | End: 2020-10-13 | Stop reason: ALTCHOICE

## 2020-01-21 NOTE — PROGRESS NOTES
[address] [facility]       PCP:  Gely Regan MD       CHIEF COMPLAINT:  Chief Complaint   Patient presents with    Nose Problem       HISTORY OF PRESENT ILLNESS:   Jerry Yip is a 77 y.o. male here today for evaluation and treatment of a problem located in the nose. The quality is \"burning when I sneeze. \"  The severity is 7 on a 1-10 scale. The duration is about two weeks. The timing is intermittent, mainly when I sneeze. Associated symptoms include lots of mucus in the nose and throat. No sinus pressure and pain. Had a sinus infection with a lot of mucus in nose and throat over New Years. REVIEW OF SYSTEMS:  CONSTITUTIONAL:  Denied fever. Denied unexplained weight loss, over 20 pounds in the past six months. EARS, NOSE, THROAT:  Denied otorrhea, otalgia, hearing loss, tinnitus, nasal dyspnea, rhinorrhea, sore throat and chronic hoarseness. PAST MEDICAL HISTORY:   Past Medical History:   Diagnosis Date    CAD (coronary artery disease)     Cervical spondylosis 1/21/2020    Chronic renal insufficiency, stage 2 (mild) 11/17/2016    Head pain cephalgia 1/21/2014    Hyperlipidemia     Hypertension     Perennial allergic rhinitis 2/11/2015    Pre-diabetes         Past Surgical History:   Procedure Laterality Date    CARDIAC SURGERY      Stents    CHOLECYSTECTOMY      COLONOSCOPY      CORONARY ANGIOPLASTY WITH STENT PLACEMENT      KNEE ARTHROSCOPY      right    ROTATOR CUFF REPAIR      right, and left    TONSILLECTOMY            EXAMINATION:       Vitals:    01/21/20 1257   BP: 111/66   Pulse: 61   Weight: 197 lb 1.6 oz (89.4 kg)   Height: 6' 3\" (1.905 m)     VITALS SIGNS: were reviewed. GENERAL APPEARANCE:  Well developed, well nourished, no apparent distress, no apparent deformities. ABILITY TO COMMUNICATE/QUALITY OF VOICE:  Communicated without difficulty. Normal voice.      INSPECTION, HEAD AND FACE: Normal overall appearance, with no scars, lesions or masses. SINUSES:  The maxillary and frontal sinuses were nontender, bilaterally. EXTERNAL EAR/NOSE:  Normal pinnae and mastoids. Normal external nose. EARS, OTOSCOPY:  The TMs and EACs appeared to be normal bilaterally. NOSE:  The nasal septum was midline. The inferior turbinates were normal.  The nasal mucosa and secretions were normal.  No pus or polyps were seen. LIPS, TEETH AND GUMS:  Unremarkable.   (+) OROPHARYNX/ORAL CAVITY:  Post tonsillectomy. Oral mucosa, hard and soft palates, tongue, and pharynx were normal.    NECK:  No masses or tenderness. No abnormal appearance, asymmetry or abnormal tracheal position. Laryngeal cartilages and hyoid bone were normal to palpation, with normal laryngeal crepitus. LYMPH NODES, CERVICAL, FACIAL AND SUPRACLAVICULAR:  No lymphadenopathy. THYROID:  No nodules, enlargement, tenderness or masses. CRANIAL NERVES:  II - XII intact, with no apparent deficits. IMPRESSION / Vicente [place] / [place] / PROCEDURES:       Pranav Molina was seen today for nose problem. Diagnoses and all orders for this visit:    Chronic rhinitis  -     fluticasone (FLONASE) 50 MCG/ACT nasal spray; 2 sprays in each nostril daily. -     methylPREDNISolone (MEDROL DOSEPACK) 4 MG tablet; Take by mouth, as directed by package instructions. Post-nasal drip    Phlegm in throat    Chronic seasonal allergic rhinitis due to pollen         RECOMMENDATIONS / PLAN:    Return if symptoms worsen or fail to clear with treatment, or, for any further Ear, Nose, Throat, or Sinus problems.          >>> Please note that this note was completed using Dragon voice recognition transcription. Every effort was made to ensure accuracy; however, inadvertent  transcription errors may be present despite my best efforts to edit errors.

## 2020-02-20 PROBLEM — Z12.5 SCREENING FOR PROSTATE CANCER: Status: RESOLVED | Noted: 2019-02-19 | Resolved: 2020-02-20

## 2020-08-19 ENCOUNTER — OFFICE VISIT (OUTPATIENT)
Dept: ENT CLINIC | Age: 70
End: 2020-08-19
Payer: MEDICARE

## 2020-08-19 VITALS — DIASTOLIC BLOOD PRESSURE: 75 MMHG | SYSTOLIC BLOOD PRESSURE: 119 MMHG | TEMPERATURE: 96.9 F | HEART RATE: 55 BPM

## 2020-08-19 PROCEDURE — 99214 OFFICE O/P EST MOD 30 MIN: CPT | Performed by: OTOLARYNGOLOGY

## 2020-08-19 PROCEDURE — G8427 DOCREV CUR MEDS BY ELIG CLIN: HCPCS | Performed by: OTOLARYNGOLOGY

## 2020-08-19 PROCEDURE — G8420 CALC BMI NORM PARAMETERS: HCPCS | Performed by: OTOLARYNGOLOGY

## 2020-08-19 PROCEDURE — 4040F PNEUMOC VAC/ADMIN/RCVD: CPT | Performed by: OTOLARYNGOLOGY

## 2020-08-19 PROCEDURE — 3017F COLORECTAL CA SCREEN DOC REV: CPT | Performed by: OTOLARYNGOLOGY

## 2020-08-19 PROCEDURE — 1123F ACP DISCUSS/DSCN MKR DOCD: CPT | Performed by: OTOLARYNGOLOGY

## 2020-08-19 PROCEDURE — 1036F TOBACCO NON-USER: CPT | Performed by: OTOLARYNGOLOGY

## 2020-08-19 RX ORDER — METHYLPREDNISOLONE 4 MG/1
TABLET ORAL
Qty: 1 KIT | Refills: 0 | Status: SHIPPED | OUTPATIENT
Start: 2020-08-19 | End: 2020-10-13 | Stop reason: ALTCHOICE

## 2020-08-19 RX ORDER — AZITHROMYCIN 250 MG/1
TABLET, FILM COATED ORAL
Qty: 2 PACKET | Refills: 0 | Status: SHIPPED | OUTPATIENT
Start: 2020-08-19 | End: 2020-10-13 | Stop reason: ALTCHOICE

## 2020-08-19 NOTE — PROGRESS NOTES
[facility]        PCP:  Ashely Kumar MD       CHIEF COMPLAINT:  Chief Complaint   Patient presents with    Pain     Sore throat. HISTORY OF PRESENT ILLNESS:   Marilin Canada is a 77 y.o. male here today for for evaluation and treatment of a problem located in the throat. The quality is sore throat. \"It feels like the sore throat I had before my stent. \"  The severity is 7-8 after exerting myself. The duration is one month. The timing is intermittent  comes and goes. \"Mainly when I'm exerting myself. If I'm not exerting myself, I hardly ever notice it. \"  He stated that he just recently had a heart evaluation, including stress test and angiogram, and all testing was negative and heart was okay. \"My  throat is sore, real sore, especially when exerting myself. It is in the middle of my throat. Heart okay stress test angiogram all okay      REVIEW OF SYSTEMS:  CONSTITUTIONAL:  Denied fever and chills. Denied unexplained weight loss, over 20 pounds in the past six months. EARS, NOSE, THROAT:  Denied otorrhea, otalgia, nasal dyspnea, rhinorrhea, and hoarseness. PAST MEDICAL HISTORY:   Past Medical History:   Diagnosis Date    CAD (coronary artery disease)     Cervical spondylosis 1/21/2020    Chronic renal insufficiency, stage 2 (mild) 11/17/2016    Head pain cephalgia 1/21/2014    Hyperlipidemia     Hypertension     Perennial allergic rhinitis 2/11/2015    Pre-diabetes         Past Surgical History:   Procedure Laterality Date    CARDIAC SURGERY      Stents    CHOLECYSTECTOMY      COLONOSCOPY      CORONARY ANGIOPLASTY WITH STENT PLACEMENT      KNEE ARTHROSCOPY      right    ROTATOR CUFF REPAIR      right, and left    TONSILLECTOMY            EXAMINATION:     Vitals:    08/19/20 1137   BP: 119/75   Pulse: 55   Temp: 96.9 °F (36.1 °C)      VITALS SIGNS: were reviewed.   GENERAL APPEARANCE: Well developed, well nourished, no apparent distress, no apparent deformities. ABILITY TO COMMUNICATE/QUALITY OF VOICE:  Communicated without difficulty. Normal voice. INSPECTION, HEAD AND FACE: Normal overall appearance, with no scars, lesions or masses. EYES:  Extraocular motion was intact, bilaterally. Normal primary gaze alignment. Sclera and conjunctiva clear bilaterally. SINUSES:  The maxillary and frontal sinuses were nontender, bilaterally. SALIVARY GLANDS:  The parotid, submandibular, and sublingual glands were normal bilaterally. EXTERNAL EAR/NOSE:  Normal pinnae and mastoids. Normal external nose. EARS, OTOSCOPY: The TMs and EACs appeared to be normal bilaterally. NOSE:  The nasal septum was midline. The inferior turbinates were normal.  The nasal mucosa and secretions were normal.  No pus or polyps were seen. LIPS, TEETH AND GUMS:  Unremarkable. OROPHARYNX/ORAL CAVITY:  Oral mucosa, hard and soft palates, tongue, and pharynx were normal.  NECK:  No masses or tenderness. No abnormal appearance, asymmetry or abnormal tracheal position. Laryngeal cartilages and hyoid bone were normal to palpation, with normal laryngeal crepitus. THYROID:  No nodules, enlargement, tenderness or masses. LYMPH NODES, CERVICAL, FACIAL AND SUPRACLAVICULAR:  No lymphadenopathy. IMPRESSION / Youlanda Cough / Devin Malick:       Jackie Quezada was seen today for pain. Diagnoses and all orders for this visit:    Throat pain in adult  -     azithromycin (ZITHROMAX Z-ENEDINA) 250 MG tablet; Take by mouth: 2 tablets on day 1, then 1 tab on day 2 to 5. Start 2nd enedina on day 10. Take 2 tablets on day 10, then 1 tab on day 11 to 15  -     methylPREDNISolone (MEDROL DOSEPACK) 4 MG tablet; Take by mouth, as directed by package instructions. RECOMMENDATIONS / PLAN:    Return if symptoms worsen or fail to clear up with treatment or, for any further Ear, Nose, Throat, or Sinus problems.

## 2020-08-20 ENCOUNTER — TELEPHONE (OUTPATIENT)
Dept: ENT CLINIC | Age: 70
End: 2020-08-20

## 2020-09-06 PROBLEM — R07.0 THROAT PAIN IN ADULT: Status: ACTIVE | Noted: 2020-09-06

## 2020-10-13 ENCOUNTER — OFFICE VISIT (OUTPATIENT)
Dept: ENT CLINIC | Age: 70
End: 2020-10-13
Payer: MEDICARE

## 2020-10-13 VITALS
SYSTOLIC BLOOD PRESSURE: 138 MMHG | TEMPERATURE: 97.1 F | WEIGHT: 200 LBS | HEART RATE: 57 BPM | DIASTOLIC BLOOD PRESSURE: 72 MMHG | BODY MASS INDEX: 25 KG/M2

## 2020-10-13 PROCEDURE — G8417 CALC BMI ABV UP PARAM F/U: HCPCS | Performed by: OTOLARYNGOLOGY

## 2020-10-13 PROCEDURE — G8427 DOCREV CUR MEDS BY ELIG CLIN: HCPCS | Performed by: OTOLARYNGOLOGY

## 2020-10-13 PROCEDURE — 1036F TOBACCO NON-USER: CPT | Performed by: OTOLARYNGOLOGY

## 2020-10-13 PROCEDURE — G8484 FLU IMMUNIZE NO ADMIN: HCPCS | Performed by: OTOLARYNGOLOGY

## 2020-10-13 PROCEDURE — 99213 OFFICE O/P EST LOW 20 MIN: CPT | Performed by: OTOLARYNGOLOGY

## 2020-10-13 PROCEDURE — 4040F PNEUMOC VAC/ADMIN/RCVD: CPT | Performed by: OTOLARYNGOLOGY

## 2020-10-13 PROCEDURE — 3017F COLORECTAL CA SCREEN DOC REV: CPT | Performed by: OTOLARYNGOLOGY

## 2020-10-13 PROCEDURE — 1123F ACP DISCUSS/DSCN MKR DOCD: CPT | Performed by: OTOLARYNGOLOGY

## 2020-10-13 RX ORDER — FLUTICASONE PROPIONATE 50 MCG
SPRAY, SUSPENSION (ML) NASAL
Qty: 1 BOTTLE | Refills: 2 | Status: SHIPPED | OUTPATIENT
Start: 2020-10-13 | End: 2021-07-15 | Stop reason: SDUPTHER

## 2020-10-13 RX ORDER — FEXOFENADINE HCL 180 MG/1
180 TABLET ORAL DAILY
Qty: 30 TABLET | Refills: 1 | Status: SHIPPED | OUTPATIENT
Start: 2020-10-13 | End: 2021-07-15 | Stop reason: SDUPTHER

## 2020-10-13 NOTE — PATIENT INSTRUCTIONS
MUCINEX THERAPY  Take one Mucinex (blue box) maximum strength 1200 mg tablet every 12 hours. SINUS RINSE  Do a nasal-sinus rinse two times daily (eg. NeilMed sinus rinse, OTC) using distilled water; DO NOT USE TAP WATER! This is because of the possibility of ameoba or other microorganisms in tap water, which can result in a fatal disease. Alternatively, you could use a blue bulb syringe and solution of 1/4 tsp of table salt in 8 ounces (one cup or 240 ml) of distilled water. ALLERGY MANAGEMENT  1. Continue Fluticasone/Flonase  2. Start Fexofenadine/Allegra.

## 2020-10-13 NOTE — PROGRESS NOTES
[facility]         PCP:  Lisa Davila MD      CHIEF COMPLAINT:  Chief Complaint   Patient presents with    Nasal Congestion     and throat congestion       HISTORY OF PRESENT ILLNESS:   Andrae More is a 77 y.o. male here for evaluation and treatment of recheck and follow-up of a problem located in the nose. The quality is nasal congestion. Clogged up in the nose. The severity is moderate. Associated symptoms include throat congestion, getting clogged up and sore from coughing up mucus, throat gets irritated. REVIEW OF SYSTEMS:   EARS, NOSE, THROAT:  Denied otorrhea, otalgia, hearing loss, tinnitus, rhinorrhea, sore throat and chronic hoarseness. EXAMINATION:      Vitals:    10/13/20 1500   BP: 138/72   Pulse: 57   Temp: 97.1 °F (36.2 °C)   Weight: 200 lb (90.7 kg)      VITALS SIGNS were reviewed. GENERAL APPEARANCE:  Well developed, well nourished, no apparent distress, no apparent deformities. ABILITY TO COMMUNICATE/QUALITY OF VOICE:  Communicated without difficulty. Normal voice. No hot potato voice. No hoarseness. EXTERNAL EAR/NOSE:  Normal pinnae and mastoids. Normal external nose. EARS, OTOSCOPY: The TMs and EACs appeared to be normal bilaterally. NOSE:  The nasal septum was midline. The inferior turbinates were normal.  The nasal mucosa and secretions were normal.  No pus or polyps were seen. SINUSES:  The maxillary and frontal sinuses were nontender, bilaterally. OROPHARYNX/ORAL CAVITY:  Oral mucosa, hard and soft palates, tongue, and pharynx were normal.      NECK:  No masses or tenderness. The laryngeal cartilages and hyoid bone were normal, with normal laryngeal crepitus. No abnormal appearance, asymmetry or abnormal tracheal position. PALPATION OF LYMPH NODES, CERVICAL, FACIAL AND SUPRACLAVICULAR:  No lymphadenopathy.           Boby Cantrell / Maico Muro / Lisa Salguero:       Andrae More was seen today for nasal congestion. Diagnoses and all orders for this visit:    Seasonal allergic rhinitis due to pollen  -     fluticasone (FLONASE) 50 MCG/ACT nasal spray; 2 sprays in each nostril daily. -     fexofenadine (ALLEGRA) 180 MG tablet; Take 1 tablet by mouth daily    Chronic rhinitis  -     fluticasone (FLONASE) 50 MCG/ACT nasal spray; 2 sprays in each nostril daily. -     fexofenadine (ALLEGRA) 180 MG tablet; Take 1 tablet by mouth daily         RECOMMENDATIONS / PLAN:    1. See patient instructions on file for this visit, which were fully discussed with the patient. 2. Return if symptoms worsen or fail to improve wth treatment, or, for any further ENT or sinus problems or symptoms. Mario Astudillo

## 2020-11-02 PROBLEM — J30.1 SEASONAL ALLERGIC RHINITIS DUE TO POLLEN: Status: ACTIVE | Noted: 2020-11-02

## 2021-07-15 ENCOUNTER — OFFICE VISIT (OUTPATIENT)
Dept: ENT CLINIC | Age: 71
End: 2021-07-15
Payer: MEDICARE

## 2021-07-15 VITALS — DIASTOLIC BLOOD PRESSURE: 66 MMHG | SYSTOLIC BLOOD PRESSURE: 122 MMHG | HEART RATE: 53 BPM

## 2021-07-15 DIAGNOSIS — J01.90 ACUTE RHINOSINUSITIS: Primary | ICD-10-CM

## 2021-07-15 DIAGNOSIS — J30.1 SEASONAL ALLERGIC RHINITIS DUE TO POLLEN: Chronic | ICD-10-CM

## 2021-07-15 DIAGNOSIS — J31.0 CHRONIC RHINITIS: Chronic | ICD-10-CM

## 2021-07-15 PROCEDURE — 1123F ACP DISCUSS/DSCN MKR DOCD: CPT | Performed by: OTOLARYNGOLOGY

## 2021-07-15 PROCEDURE — G8427 DOCREV CUR MEDS BY ELIG CLIN: HCPCS | Performed by: OTOLARYNGOLOGY

## 2021-07-15 PROCEDURE — 1036F TOBACCO NON-USER: CPT | Performed by: OTOLARYNGOLOGY

## 2021-07-15 PROCEDURE — G8417 CALC BMI ABV UP PARAM F/U: HCPCS | Performed by: OTOLARYNGOLOGY

## 2021-07-15 PROCEDURE — 3017F COLORECTAL CA SCREEN DOC REV: CPT | Performed by: OTOLARYNGOLOGY

## 2021-07-15 PROCEDURE — 4040F PNEUMOC VAC/ADMIN/RCVD: CPT | Performed by: OTOLARYNGOLOGY

## 2021-07-15 PROCEDURE — 99213 OFFICE O/P EST LOW 20 MIN: CPT | Performed by: OTOLARYNGOLOGY

## 2021-07-15 RX ORDER — CEFDINIR 300 MG/1
600 CAPSULE ORAL DAILY
Qty: 28 CAPSULE | Refills: 0 | Status: SHIPPED | OUTPATIENT
Start: 2021-07-15 | End: 2021-07-29

## 2021-07-15 RX ORDER — FLUTICASONE PROPIONATE 50 MCG
SPRAY, SUSPENSION (ML) NASAL
Qty: 1 BOTTLE | Refills: 2 | Status: SHIPPED | OUTPATIENT
Start: 2021-07-15 | End: 2022-07-25 | Stop reason: ALTCHOICE

## 2021-07-15 RX ORDER — FEXOFENADINE HCL 180 MG/1
180 TABLET ORAL DAILY
Qty: 30 TABLET | Refills: 1 | Status: SHIPPED | OUTPATIENT
Start: 2021-07-15 | End: 2022-07-25 | Stop reason: ALTCHOICE

## 2021-07-15 ASSESSMENT — ENCOUNTER SYMPTOMS
SORE THROAT: 1
RHINORRHEA: 1

## 2021-07-15 NOTE — PROGRESS NOTES
Left salivary gland is not diffusely enlarged or tender. Right Ear: Tympanic membrane, ear canal and external ear normal.      Left Ear: Tympanic membrane, ear canal and external ear normal.      Nose: Mucosal edema (and erythema bilaterally) and congestion present. No nasal deformity, septal deviation or rhinorrhea. Right Turbinates: Not enlarged. Left Turbinates: Not enlarged. Right Sinus: No maxillary sinus tenderness or frontal sinus tenderness. Left Sinus: No maxillary sinus tenderness or frontal sinus tenderness. Mouth/Throat:      Lips: Pink. No lesions. Mouth: Mucous membranes are moist. No oral lesions. Tongue: No lesions. Palate: No mass and lesions. Pharynx: Oropharynx is clear. Uvula midline. No oropharyngeal exudate or posterior oropharyngeal erythema. Tonsils: No tonsillar exudate or tonsillar abscesses. Neck:      Thyroid: No thyroid mass, thyromegaly or thyroid tenderness. Trachea: No tracheal deviation. Musculoskeletal:      Cervical back: Normal range of motion and neck supple. No rigidity. No muscular tenderness. Lymphadenopathy:      Cervical: No cervical adenopathy. Neurological:      Mental Status: He is alert. Adamaris Berman / More Acuña / Gatito Bernstein was seen today for sinusitis. Diagnoses and all orders for this visit:    Acute rhinosinusitis  -     fexofenadine (ALLEGRA) 180 MG tablet; Take 1 tablet by mouth daily  -     fluticasone (FLONASE) 50 MCG/ACT nasal spray; 2 sprays in each nostril daily. -     cefdinir (OMNICEF) 300 MG capsule; Take 2 capsules by mouth daily for 14 days Take both capsules together at the same time, once daily. Seasonal allergic rhinitis due to pollen  -     fexofenadine (ALLEGRA) 180 MG tablet; Take 1 tablet by mouth daily  -     fluticasone (FLONASE) 50 MCG/ACT nasal spray; 2 sprays in each nostril daily. Chronic rhinitis  -     fexofenadine (ALLEGRA) 180 MG tablet;  Take 1 tablet by mouth daily  -     fluticasone (FLONASE) 50 MCG/ACT nasal spray; 2 sprays in each nostril daily. RECOMMENDATIONS/PLAN      1. Acetaminophen (eg. Tylenol) (over the counter medications) as needed for pain. 2. See patient instructions on file for this visit. 3. Return if symptoms worsen or fail to clear up with treatment or, for any further ENT or sinus problems or symptoms.           MEDICAL DECISION MAKING    # and complexity of problems addressed:  14706 - Low     1 stable chronic illness  1 acute, uncomplicated illness or injury     Amount and/or Complexity of Data to be Reviewed and Analyzed  76772 - Straightforward  no data or only 1 test or document reviewed or ordered    Risk of Complications and /or Morbidity or Mortality of Patient Management  08402 - Moderate  Prescription drug management

## 2021-07-15 NOTE — PATIENT INSTRUCTIONS
with other medications, nausea, headache, diarrhea, persistent symptoms, failure to improve, and the following:     Cefdinir (antibiotic):  diarrhea, colitis (severe infection and inflammation of the large intestine), pseudomembranous colitis (severe infection and inflammation of the colon, usually due to C. difficile bacteria)  yeast or fungal  infections, Nieto-Bravo syndrome (very rare necrotic skin reaction with peeling of skin, blisters and arthritis), persistent symptoms/infection, and failure to improve. Fluticasone:  nosebleed, burning sensation, atrophy of nasal mucosa, septal ulceration or perforation, nasal irritation, nasal yeast infection,  drowsiness, bad taste.      ~~~>>> Also please read the medication information in this AVS and all information given to you by the pharmacist.         Patient Education        cefdinir  Pronunciation:  LAWRENCE sullivan  Brand:  Neymar Haines Omni-Pac  What is the most important information I should know about cefdinir? Do not take this medicine if you are allergic to cefdinir, or to similar antibiotics, such as Ceftin, Cefzil, Keflex, and others. What is cefdinir? Cefdinir is a cephalosporin (SEF a low spor in) antibiotic that is used to treat many different types of infections caused by bacteria. Cefdinir may also be used for purposes not listed in this medication guide. What should I discuss with my healthcare provider before taking cefdinir? You should not take this medicine if you are allergic to cefdinir or any other cephalosporin antibiotic (cefadroxil, cefprozil, cefazolin, cefalexin, Keflex, and others). Tell your doctor if you have ever had:  · kidney disease (or if you are on dialysis);  · intestinal problems, such as colitis; or  · an allergy to any drugs (especially penicillins). Cefdinir liquid contains sucrose. Talk to your doctor before using this form of cefdinir if you have diabetes.   Tell your doctor if you are pregnant or reaction (fever, sore throat, burning eyes, skin pain, red or purple skin rash with blistering and peeling). Call your doctor at once if you have:  · severe stomach pain, diarrhea that is watery or bloody (even if it occurs months after your last dose);  · fever, chills, body aches, flu symptoms;  · pale skin, easy bruising, unusual bleeding;  · seizure (convulsions);  · fever, weakness, confusion;  · dark colored urine, jaundice (yellowing of the skin or eyes); or  · kidney problems --little or no urination, swelling in your feet or ankles, feeling tired or short of breath. Common side effects may include:  · nausea, vomiting, stomach pain, diarrhea;  · vaginal itching or discharge;  · headache; or  · rash (including diaper rash in an infant taking liquid cefdinir. This is not a complete list of side effects and others may occur. Call your doctor for medical advice about side effects. You may report side effects to FDA at 3-253-FDA-1609. What other drugs will affect cefdinir? Tell your doctor about all your other medicines, especially:  · probenecid; or  · vitamin or mineral supplements that contain iron. This list is not complete. Other drugs may affect cefdinir, including prescription and over-the-counter medicines, vitamins, and herbal products. Not all possible drug interactions are listed here. Where can I get more information? Your pharmacist can provide more information about cefdinir. Remember, keep this and all other medicines out of the reach of children, never share your medicines with others, and use this medication only for the indication prescribed. Every effort has been made to ensure that the information provided by [address] is accurate, up-to-date, and complete, but no guarantee is made to that effect. Drug information contained herein may be time sensitive.  [employer] information has been compiled for use by healthcare practitioners and consumers in the [place] and therefore [facility] does not warrant that uses outside of the [place] are appropriate, unless specifically indicated otherwise. [facility]'s drug information does not endorse drugs, diagnose patients or recommend therapy. [facility] drug information is an informational resource designed to assist licensed healthcare practitioners in caring for their patients and/or to serve consumers viewing this service as a supplement to, and not a substitute for, the expertise, skill, knowledge and judgment of healthcare practitioners. The absence of a warning for a given drug or drug combination in no way should be construed to indicate that the drug or drug combination is safe, effective or appropriate for any given patient. [facility] does not assume any responsibility for any aspect of healthcare administered with the aid of information [facility] provides. The information contained herein is not intended to cover all possible uses, directions, precautions, warnings, drug interactions, allergic reactions, or adverse effects. If you have questions about the drugs you are taking, check with your doctor, nurse or pharmacist.  Copyright 6078-4992 [address] [place] [address] Dr ROUSE. Version: 7.03. Revision date: 1/4/2021. Care instructions adapted under license by [facility] ([facility]). If you have questions about a medical condition or this instruction, always ask your healthcare professional. [place] [address] any warranty or liability for your use of this information. Patient Education        fexofenadine  Pronunciation:  FEX oh FEN a manoj  Brand:  Allegra, Aller-Ease, Children's Allegra Allergy, Mucinex Allergy, Wal-Fex  What is the most important information I should know about fexofenadine? Do not take fexofenadine with fruit juice (such as apple, orange, or grapefruit). What is fexofenadine? Fexofenadine is an antihistamine that is used to treat the symptoms of seasonal allergies (hay fever) in adults and children.   Fexofenadine is also used to treat skin itching and hives caused by a condition called chronic idiopathic urticaria in adults and children at least 10years old. There are many brands and forms of fexofenadine available. Not all brands are listed on this leaflet. Fexofenadine may also be used for purposes not listed in this medication guide. What should I discuss with my healthcare provider before taking fexofenadine? You should not use fexofenadine if you are allergic to it. Ask a doctor or pharmacist if this medicine is safe to use if you have:  · kidney disease. Ask a doctor before using this medicine if you are pregnant or breastfeeding. Older adults may be more sensitive to the effects of this medicine. If you are 72 or older, ask a doctor before taking fexofenadine. This medicine may contain phenylalanine. Tell your doctor if you have phenylketonuria (PKU). How should I take fexofenadine? Use exactly as directed on the label, or as prescribed by your doctor. Always follow directions on the medicine label about giving cough or cold medicine to a child. · Ask a doctor before giving fexofenadine liquid to a child younger than 3years old. · The disintegrating (melting) tablets are not for use in a child younger than 10years old. · The 12-hour and 24-hour forms  of fexofenadine are not for use in a child younger than 15years old. Take this medicine only with water. Take the disintegrating tablet on an empty stomach. Shake the oral suspension (liquid) before you measure a dose. Use the dosing cup provided, or use a medicine dose-measuring device (not a kitchen spoon). Remove an orally disintegrating tablet from the package only when you are ready to take the medicine. Place the tablet on your tongue and allow it to dissolve, without chewing. Swallow several times as the tablet dissolves. Call your doctor if your symptoms do not improve, or if they get worse.   Store fexofenadine in its original package at room temperature, away from moisture and heat. Do not allow liquid medicine to freeze. What happens if I miss a dose? Since allergy medicine is used when needed, you may not be on a dosing schedule. Skip any missed dose if it's almost time for your next dose. Do not use two doses at one time. What happens if I overdose? Seek emergency medical attention or call the Poison Help line at 1-234.208.3213. Overdose symptoms may include dry mouth, dizziness, or drowsiness. What should I avoid while taking fexofenadine? Do not take fexofenadine with fruit juice (such as apple, orange, or grapefruit). These juices can make it harder for your body to absorb fexofenadine. Avoid taking an antacid within 2 hours before or after you take fexofenadine. Certain antacids can make it harder for your body to absorb this medicine. Ask a doctor or pharmacist before using other cough or cold medicines that may contain similar ingredients. What are the possible side effects of fexofenadine? Get emergency medical help if you have signs of an allergic reaction: hives; difficult breathing; swelling of your face, lips, tongue, or throat. Stop using fexofenadine and call your doctor at once if you have:  · flu-like symptoms (fever, chills, unusual tiredness);  · new or worsening cough;  · pain; or  · signs of an ear infection --fever, ear pain or full feeling, trouble hearing, drainage from the ear, fussiness in a child. Common side effects may include:  · headache;  · back pain; or  · cold symptoms such as stuffy nose, sinus pain, sore throat. This is not a complete list of side effects and others may occur. Call your doctor for medical advice about side effects. You may report side effects to FDA at 8-592-PCU-2529. What other drugs will affect fexofenadine? Using fexofenadine with other drugs that make you drowsy can worsen this effect.  Ask your doctor before using opioid medication, a sleeping pill, a muscle relaxer, or medicine for anxiety or seizures. Ask a doctor or pharmacist before using fexofenadine with any other medications, especially:  · ketoconazole; or  · erythromycin. This list is not complete. Other drugs may affect fexofenadine, including prescription and over-the-counter medicines, vitamins, and herbal products. Not all possible drug interactions are listed here. Where can I get more information? Your pharmacist can provide more information about fexofenadine. Remember, keep this and all other medicines out of the reach of children, never share your medicines with others, and use this medication only for the indication prescribed. Every effort has been made to ensure that the information provided by [facility]  is accurate, up-to-date, and complete, but no guarantee is made to that effect. Drug information contained herein may be time sensitive. [facility] information has been compiled for use by healthcare practitioners and consumers in the [place] and therefore [employer] does not warrant that uses outside of the [place] are appropriate, unless specifically indicated otherwise. [facility]'s drug information does not endorse drugs, diagnose patients or recommend therapy. [facility] drug information is an informational resource designed to assist licensed healthcare practitioners in caring for their patients and/or to serve consumers viewing this service as a supplement to, and not a substitute for, the expertise, skill, knowledge and judgment of healthcare practitioners. The absence of a warning for a given drug or drug combination in no way should be construed to indicate that the drug or drug combination is safe, effective or appropriate for any given patient. [facility] does not assume any responsibility for any aspect of healthcare administered with the aid of information [facility] provides.  The information contained herein is not intended to cover all possible uses, directions, precautions, warnings, drug interactions, allergic reactions, or adverse effects. If you have questions about the drugs you are taking, check with your doctor, nurse or pharmacist.  Copyright 6274-8199 [address]: 11.01. Revision date: 5/1/2019. Care instructions adapted under license by [facility] ([facility]). If you have questions about a medical condition or this instruction, always ask your healthcare professional. [place] [address] any warranty or liability for your use of this information. Patient Education        fluticasone nasal  Pronunciation:  floo TIK a sone  Brand:  Flonase, Veramyst, Kelsy Na  What is the most important information I should know about fluticasone nasal?  Follow all directions on your medicine label and package. Tell each of your healthcare providers about all your medical conditions, allergies, and all medicines you use. What is fluticasone nasal?  Fluticasone nasal (for the nose) is a steroid medicine that is used to treat nasal congestion, sneezing, runny nose, and itchy or watery eyes caused by seasonal or year-round allergies. The Kelsy Na brand of this medicine is for use only in adults. Veramyst may be used in children as young as 3years old. Flonase is for use in adults and children who are at least 3years old. Fluticasone nasal may also be used for purposes not listed in this medication guide. What should I discuss with my healthcare provider before using fluticasone nasal?  You should not use fluticasone nasal if you are allergic to it. Fluticasone can weaken your immune system,  making it easier for you to get an infection or worsening an infection you already have or recently had. Tell your doctor about any illness or infection you have had within the past several weeks.   Tell your doctor if you have ever had:  · sores or ulcers inside your nose;  · injury of or surgery on your nose;  · glaucoma or cataracts;  · liver disease;  · diabetes;  · a weak immune system; or  · any type of infection (bacterial, fungal, viral, or parasitic). If you use fluticasone nasal without a prescription and you have any medical conditions, ask a doctor or pharmacist if this medicine is safe for you. Tell your doctor if you are pregnant or breast-feeding. How should I use fluticasone nasal?  Follow all directions on your prescription label and read all medication guides or instruction sheets. Use the medicine exactly as directed. Do not share this medicine with another person, even if they have the same symptoms you have. Your dose will depend on the fluticasone brand or strength you use, and your dose may change once your symptoms improve. Follow all dosing instructions very carefully. A child using the nasal spray should be supervised by an adult. Read and carefully follow any Instructions for Use provided with your medicine. Ask your doctor or pharmacist if you do not understand these instructions. Shake the nasal spray just before each use. If you switched to fluticasone from another steroid medicine, you should not stop using it suddenly. Follow your doctor's instructions about tapering your dose. It may take several days before your symptoms improve. Keep using the medication as directed and tell your doctor if your symptoms do not improve after a week of treatment. Store fluticasone nasal in an upright position at room temperature, away from moisture and heat. Throw the spray bottle away after you have used 120 sprays, even if there is still medicine left in the bottle. What happens if I miss a dose? Use the medicine as soon as you can, but skip the missed dose if it is almost time for your next dose. Do not use two doses at one time. What happens if I overdose? Seek emergency medical attention or call the Poison Help line at 1-214.463.4500. An overdose of fluticasone nasal is not expected to produce life threatening symptoms.  Long term use of steroid medicine can lead to glaucoma, cataracts, thinning skin, easy bruising, changes in body fat (especially in your face, neck, back, and waist), increased acne or facial hair, menstrual problems, impotence, or loss of interest in sex. What should I avoid while using fluticasone nasal?  Avoid getting the spray in your eyes or mouth. If this does happen, rinse with water. Avoid being near people who are sick or have infections. Call your doctor for preventive treatment if you are exposed to chickenpox or measles. These conditions can be serious or even fatal in people who are using fluticasone nasal.  What are the possible side effects of fluticasone nasal?  Get emergency medical help if you have signs of an allergic reaction: hives, rash; feeling light-headed; difficult breathing; swelling of your face, lips, tongue, or throat. Call your doctor at once if you have:  · severe or ongoing nosebleeds;  · noisy breathing, runny nose, or crusting around your nostrils;  · redness, sores, or white patches in your mouth or throat;  · fever, chills, body aches;  · blurred vision, eye pain, or seeing halos around lights;  · any wound that will not heal; or  · signs of a hormonal disorder --worsening tiredness or muscle weakness, feeling light-headed, nausea, vomiting. Steroid medicine can affect growth in children. Tell your doctor if your child is not growing at a normal rate while using this medicine. Common side effects may include:  · minor nosebleed, burning or itching in your nose;  · sores or white patches inside or around your nose;  · cough, trouble breathing;  · headache, back pain;  · sinus pain, sore throat, fever; or  · nausea, vomiting. This is not a complete list of side effects and others may occur. Call your doctor for medical advice about side effects. You may report side effects to FDA at 0-503-FBD-1725.   What other drugs will affect fluticasone nasal?  Tell your doctor about all your other medicines, especially:  · antifungal medicine; or  · antiviral medicine to treat hepatis C or HIV/AIDS. This list is not complete. Other drugs may affect fluticasone nasal, including prescription and over-the-counter medicines, vitamins, and herbal products. Not all possible drug interactions are listed here. Where can I get more information? Your pharmacist can provide more information about fluticasone nasal.  Remember, keep this and all other medicines out of the reach of children, never share your medicines with others, and use this medication only for the indication prescribed. Every effort has been made to ensure that the information provided by [address] is accurate, up-to-date, and complete, but no guarantee is made to that effect. Drug information contained herein may be time sensitive. [facility] information has been compiled for use by healthcare practitioners and consumers in the [place] and therefore [facility] does not warrant that uses outside of the [place] are appropriate, unless specifically indicated otherwise. [facility]'s drug information does not endorse drugs, diagnose patients or recommend therapy. [facility]'s drug information is an informational resource designed to assist licensed healthcare practitioners in caring for their patients and/or to serve consumers viewing this service as a supplement to, and not a substitute for, the expertise, skill, knowledge and judgment of healthcare practitioners. The absence of a warning for a given drug or drug combination in no way should be construed to indicate that the drug or drug combination is safe, effective or appropriate for any given patient. [facility] does not assume any responsibility for any aspect of healthcare administered with the aid of information [facility] provides.  The information contained herein is not intended to cover all possible uses, directions, precautions, warnings, drug interactions, allergic reactions, or adverse effects. If you have questions about the drugs you are taking, check with your doctor, nurse or pharmacist.  Copyright 3483-6706 [address]: 10.02. Revision date: 12/30/2019. Care instructions adapted under license by [facility] ([facility]). If you have questions about a medical condition or this instruction, always ask your healthcare professional. [place] [address] any warranty or liability for your use of this information.

## 2021-12-08 ENCOUNTER — OFFICE VISIT (OUTPATIENT)
Dept: ENT CLINIC | Age: 71
End: 2021-12-08
Payer: MEDICARE

## 2021-12-08 VITALS — DIASTOLIC BLOOD PRESSURE: 76 MMHG | TEMPERATURE: 97.1 F | SYSTOLIC BLOOD PRESSURE: 144 MMHG | HEART RATE: 76 BPM

## 2021-12-08 DIAGNOSIS — J01.90 ACUTE RHINOSINUSITIS: Primary | ICD-10-CM

## 2021-12-08 DIAGNOSIS — J30.1 SEASONAL ALLERGIC RHINITIS DUE TO POLLEN: Chronic | ICD-10-CM

## 2021-12-08 DIAGNOSIS — J32.9 CHRONIC SINUSITIS, UNSPECIFIED LOCATION: Chronic | ICD-10-CM

## 2021-12-08 PROCEDURE — G8421 BMI NOT CALCULATED: HCPCS | Performed by: OTOLARYNGOLOGY

## 2021-12-08 PROCEDURE — 3017F COLORECTAL CA SCREEN DOC REV: CPT | Performed by: OTOLARYNGOLOGY

## 2021-12-08 PROCEDURE — 1036F TOBACCO NON-USER: CPT | Performed by: OTOLARYNGOLOGY

## 2021-12-08 PROCEDURE — G8427 DOCREV CUR MEDS BY ELIG CLIN: HCPCS | Performed by: OTOLARYNGOLOGY

## 2021-12-08 PROCEDURE — 1123F ACP DISCUSS/DSCN MKR DOCD: CPT | Performed by: OTOLARYNGOLOGY

## 2021-12-08 PROCEDURE — G8484 FLU IMMUNIZE NO ADMIN: HCPCS | Performed by: OTOLARYNGOLOGY

## 2021-12-08 PROCEDURE — 4040F PNEUMOC VAC/ADMIN/RCVD: CPT | Performed by: OTOLARYNGOLOGY

## 2021-12-08 PROCEDURE — 99214 OFFICE O/P EST MOD 30 MIN: CPT | Performed by: OTOLARYNGOLOGY

## 2021-12-08 RX ORDER — AZITHROMYCIN 250 MG/1
TABLET, FILM COATED ORAL
Qty: 2 PACKET | Refills: 0 | Status: SHIPPED | OUTPATIENT
Start: 2021-12-08 | End: 2022-07-25 | Stop reason: ALTCHOICE

## 2021-12-08 ASSESSMENT — ENCOUNTER SYMPTOMS
SINUS PAIN: 0
RHINORRHEA: 0
SORE THROAT: 1

## 2021-12-08 NOTE — PROGRESS NOTES
[facility]       PCP:  Castillo Joyce MD      [facility]  Chief Complaint   Patient presents with    Nasal Congestion     mucous and drainage     Sinus Problem       HISTORY OF PRESENT ILLNESS       Radha Ngo is a 80 y.o. male here for evaluation and treatment of sinus infection. He reported the onset, 5 weeks ago, of sinus symptoms with \"coughing, a lot and mucus in my nose and congestion in my nose. This just won't go away. It's a build up of mucus, thick and yellow. I blow it out a lot. Sometimes there is blood in the mucus from the right side. One month ago, my PCP told me to go to an urgent care for a bad cough. They prescribed amoxicillin and it still didn't go away. \"        REVIEW OF SYSTEMS   Review of Systems   Constitutional: Negative for chills and fever. HENT: Positive for congestion, nosebleeds (see HPI) and sore throat. Negative for ear discharge, ear pain, rhinorrhea and sinus pain. PAST MEDICAL HISTORY    Past Medical History:   Diagnosis Date    CAD (coronary artery disease)     Cervical spondylosis 1/21/2020    Chronic renal insufficiency, stage 2 (mild) 11/17/2016    Head pain cephalgia 1/21/2014    Hyperlipidemia     Hypertension     Perennial allergic rhinitis 2/11/2015    Pre-diabetes        Past Surgical History:   Procedure Laterality Date    CARDIAC SURGERY      Stents    CHOLECYSTECTOMY      COLONOSCOPY      CORONARY ANGIOPLASTY WITH STENT PLACEMENT      KNEE ARTHROSCOPY      right    ROTATOR CUFF REPAIR      right, and left    TONSILLECTOMY           EXAMINATION    Vitals:    12/08/21 0823   BP: (!) 144/76   Site: Right Upper Arm   Position: Sitting   Cuff Size: Large Adult   Pulse: 76   Temp: 97.1 °F (36.2 °C)   TempSrc: Temporal       Physical Exam  Vitals reviewed. Constitutional:       General: He is awake. He is not in acute distress.      Appearance: Normal appearance. He is well-developed. He is not ill-appearing or toxic-appearing. HENT:      Head: Normocephalic and atraumatic. Salivary Glands: Right salivary gland is not diffusely enlarged or tender. Left salivary gland is not diffusely enlarged or tender. Right Ear: Tympanic membrane, ear canal and external ear normal.      Left Ear: Tympanic membrane, ear canal and external ear normal.      Nose: Mucosal edema, congestion and rhinorrhea present. No nasal deformity or septal deviation. Right Turbinates: Not enlarged. Left Turbinates: Not enlarged. Right Sinus: No maxillary sinus tenderness or frontal sinus tenderness. Left Sinus: No maxillary sinus tenderness or frontal sinus tenderness. Mouth/Throat:      Lips: Pink. No lesions. Mouth: Mucous membranes are moist. No oral lesions. Tongue: No lesions. Palate: No mass and lesions. Pharynx: Oropharynx is clear. Uvula midline. No oropharyngeal exudate or posterior oropharyngeal erythema. Tonsils: No tonsillar exudate or tonsillar abscesses. Eyes:      General: No scleral icterus. Neck:      Thyroid: No thyroid mass, thyromegaly or thyroid tenderness. Trachea: No tracheal deviation. Comments: No cervical masses or tenderness. Musculoskeletal:      Cervical back: Normal range of motion and neck supple. Lymphadenopathy:      Cervical: No cervical adenopathy. Neurological:      Mental Status: He is alert. Pily Chacon / Lexie Bhandari / Rahel Browne was seen today for nasal congestion and sinus problem. Diagnoses and all orders for this visit:    Acute rhinosinusitis  -     azithromycin (ZITHROMAX Z-ENEDINA) 250 MG tablet; Take by mouth: 2 tablets on day 1, then 1 tab on day 2 to 5. Start 2nd enedina on day 10. Take 2 tablets on day 10, then 1 tab on day 11 to 15           RECOMMENDATIONS/PLAN      1. See patient instructions on file for this visit.   2. Acetaminophen (eg. Tylenol) or Ibuprofen (eg. Advil) (over the counter medications) as needed for pain. 3. Return if symptoms worsen or fail to clear up after treatment, or, for any further ENT or sinus problems or symptoms.

## 2021-12-08 NOTE — PATIENT INSTRUCTIONS
RHINOSINUSITIS CARE  · You may use acetaminophen (eg. Tylenol) or Ibuprofen (eg. Advil) (over the counter medications) as needed for fever or pain. · Take Probiotic while you are taking antibiotics, to prevent diarrhea, stomach upset, pseudomembranous colitis, and C. difficile diarrhea. This may be obtained at your pharmacy or health food store. Alternatively, you may eat one cup of yogurt with active or live cultures twice daily while taking the antibiotic. Continue for two to three days after completion of the antibiotic. · Use a 12 hour decongestant spray, 0.05% oxymetazoline (e.g. Afrin, Duration, 4-Way). Spray each nostril twice three times a day for three days, then two times a day for 2 days, and then stop for two days and then repeat the cycle once. · Take one Mucinex (blue box) maximum strength 1200 mg tablet every 12 hours daily for the next ten days. · A steam inhaler mask device may be helpful. These can be purchased at your pharmacy. · Use a saline nasal/sinus irrigation system, e.g. NeilMed sinus rinse, twice daily to help to clear secretions and drainage. Use distilled water; DO NOT USE TAP WATER! This is because of the possibility of amoeba or other microorganisms in tap water, which can result in a fatal disease. Alternatively, you could use a blue bulb syringe and solution of 1/4 tsp of table salt in 8 ounces (one cup or 240 ml) of distilled water. · Use fluticasone 2 sprays in each nostril once daily. · It may take several days to several weeks for your sinusitis to clear up. It is important to take all your medications as prescribed. Please continue your antibiotics as prescribed.     · Please call the office if your condition worsens or if symptoms persist after treatment is completed.        ==============================================================      ADVERSE AND SIDE EFFECTS OF MEDICATIONS:    Please be aware of the following possible adverse reactions, side effects, and complications of the following medications, including, but not limited to: allergic reaction, interactions with other medications, nausea, headache, diarrhea, persistent symptoms, failure to improve, and the following:     Z-timo (antibiotic):  diarrhea, colitis (severe infection and inflammation of the large intestine), pseudomembranous colitis (severe infection and inflammation of the colon, usually due to C. difficile bacteria)  yeast or fungal  infections, Nieto-Bravo syndrome (very rare necrotic skin reaction with peeling of skin, blisters and arthritis), persistent symptoms/infection, and failure to improve. Fluticasone:  nosebleed, burning sensation, atrophy of nasal mucosa, septal ulceration or perforation, nasal irritation, nasal yeast infection,  drowsiness, bad taste. Taking Probiotic while you are taking antibiotics, may help to prevent diarrhea, stomach upset, pseudomembranous colitis, and C. difficile diarrhea. This may be obtained at your pharmacy or health food store. Alternatively, you may eat one cup of yogurt with active or live cultures twice daily while taking the antibiotic. Continue for two to three days after completion of the antibiotic.    ~~~>>> Also please read the medication information in this AVS and all information given to you by the pharmacist.           Patient Education        azithromycin (oral/injection)  Pronunciation:  a MARTÍNEZ medina DOUGHENRY sin  Brand:  Azithromycin 3 Day Dose Pack, Azithromycin 5 Day Dose Pack, Zithromax, Zithromax IV, Zithromax TRI-TIMO, Zithromax Z-Timo  What is the most important information I should know about azithromycin? You should not use azithromycin if you have ever had an allergic reaction, jaundice, or liver problems while taking this medicine. You should not use azithromycin if you have ever had a severe allergic reaction to similar drugs such as clarithromycin, erythromycin, or telithromycin. What is azithromycin?   Azithromycin is used to treat many different types of infections caused by bacteria, including infections of the lungs, sinus, throat, tonsils, skin, urinary tract, cervix, or genitals. Azithromycin may also be used for purposes not listed in this medication guide. What should I discuss with my healthcare provider before using azithromycin? You should not use azithromycin if you are allergic to it, or if you have ever had:  · jaundice or liver problems caused by taking azithromycin; or  · a severe allergic reaction to similar drugs such as clarithromycin, erythromycin, or telithromycin. Azithromycin oral should not be used to treat pneumonia in people who have:  · cystic fibrosis;  · an infection after being in a hospital;  · an infection in the blood;  · a weak immune system (caused by diseases such as HIV/AIDS or cancer); or  · in older adults and those who are ill or debilitated. Tell your doctor if you have ever had:  · pneumonia;  · liver or kidney disease;  · myasthenia gravis;  · low levels of potassium in your blood;  · a heart rhythm disorder; or  · long QT syndrome (in you or a family member). It is not known  whether this medicine is effective in treating genital ulcers in women. Tell your doctor if you are pregnant or breastfeeding. Taking azithromycin while breastfeeding may cause diarrhea, vomiting, or rash in the nursing baby. Azithromycin is not approved for use by anyone younger than 7 months old. Azithromycin should not be used to treat a throat or tonsil infection in a child younger than 3years old. How should I take azithromycin? Follow all directions on your prescription label and read all medication guides or instruction sheets. Use the medicine exactly as directed. Azithromycin oral  is taken by mouth. Azithromycin injection is given as an infusion into a vein, usually for 2 days before you switch to azithromycin oral. A healthcare provider will give you this injection.   You may take azithromycin oral with or without food. Shake the oral suspension (liquid) before you measure a dose. Use the dosing syringe provided, or use a medicine dose-measuring device (not a kitchen spoon). Use this medicine for the full prescribed length of time, even if your symptoms quickly improve. Skipping doses can increase your risk of infection that is resistant to medication. Azithromycin will not treat a viral infection such as the flu or a common cold. Store at room temperature away from moisture and heat. Throw away any unused liquid medicine after 10 days. What happens if I miss a dose? Take the medicine as soon as you can, but skip the missed dose if it is almost time for your next dose. Do not take two doses at one time. What happens if I overdose? Seek emergency medical attention or call the Poison Help line at 1-367.943.3511. What should I avoid while taking azithromycin? Antibiotic medicines can cause diarrhea, which may be a sign of a new infection. If you have diarrhea that is watery or bloody, call your doctor before using anti-diarrhea medicine. Azithromycin could make you sunburn more easily. Avoid sunlight or tanning beds. Wear protective clothing and use sunscreen (SPF 30 or higher) when you are outdoors. What are the possible side effects of azithromycin? Get emergency medical help if you have signs of an allergic reaction (hives, difficult breathing, swelling in your face or throat) or a severe skin reaction (fever, sore throat, burning in your eyes, skin pain, red or purple skin rash that spreads and causes blistering and peeling). Seek medical treatment if you have a serious drug reaction that can affect many parts of your body. Symptoms may include: skin rash, fever, swollen glands, muscle aches, severe weakness, unusual bruising, or yellowing of your skin or eyes.   Call your doctor at once if you have:  · severe stomach pain, diarrhea that is watery or bloody;  · fast or pounding heartbeats, fluttering in your chest, shortness of breath, and sudden dizziness (like you might pass out); or  · liver problems --nausea, vomiting, loss of appetite, stomach pain (upper right side), tiredness, itching, dark urine, mickey-colored stools, jaundice (yellowing of the skin or eyes); Call your doctor right away if a baby taking azithromycin becomes irritable or vomits while eating or nursing. Older adults may be more likely to have side effects on heart rhythm, including a life-threatening fast heart rate. Common side effects may include:  · nausea, vomiting; or  · stomach pain. This is not a complete list of side effects and others may occur. Call your doctor for medical advice about side effects. You may report side effects to FDA at 0-333-FDA-4927. What other drugs will affect azithromycin? Tell your doctor about all your other medicines, especially:  · colchicine;  · digoxin;  · nelfinavir;  · phenytoin;  · an antacid that contains aluminum or magnesium --Acid Gone, Gaviscon, Gelusil, Maalox, Milk of Magnesia, Mylanta, Pepcid Complete, Rolaids, Rulox, and others; or  · a blood thinner --warfarin, Coumadin, Jantoven. This list is not complete. Other drugs may affect azithromycin, including prescription and over-the-counter medicines, vitamins, and herbal products. Not all possible drug interactions are listed here. Where can I get more information? Your pharmacist can provide more information about azithromycin. Remember, keep this and all other medicines out of the reach of children, never share your medicines with others, and use this medication only for the indication prescribed. Every effort has been made to ensure that the information provided by [address] is accurate, up-to-date, and complete, but no guarantee is made to that effect. Drug information contained herein may be time sensitive.  [employer] information has been compiled for use by healthcare practitioners and consumers in the [place] and therefore [employer] does not warrant that uses outside of the [place] are appropriate, unless specifically indicated otherwise. [facility]'s drug information does not endorse drugs, diagnose patients or recommend therapy. [facility] drug information is an informational resource designed to assist licensed healthcare practitioners in caring for their patients and/or to serve consumers viewing this service as a supplement to, and not a substitute for, the expertise, skill, knowledge and judgment of healthcare practitioners. The absence of a warning for a given drug or drug combination in no way should be construed to indicate that the drug or drug combination is safe, effective or appropriate for any given patient. [facility] does not assume any responsibility for any aspect of healthcare administered with the aid of information [facility] provides. The information contained herein is not intended to cover all possible uses, directions, precautions, warnings, drug interactions, allergic reactions, or adverse effects. If you have questions about the drugs you are taking, check with your doctor, nurse or pharmacist.  Copyright 1321-0567 [address]: 18.01. Revision date: 5/2/2019. Care instructions adapted under license by [facility] ([facility]). If you have questions about a medical condition or this instruction, always ask your healthcare professional. [place] [address] any warranty or liability for your use of this information.

## 2022-03-01 DIAGNOSIS — J01.90 ACUTE RHINOSINUSITIS: ICD-10-CM

## 2022-03-01 DIAGNOSIS — J31.0 CHRONIC RHINITIS: Chronic | ICD-10-CM

## 2022-03-01 DIAGNOSIS — J30.1 SEASONAL ALLERGIC RHINITIS DUE TO POLLEN: Chronic | ICD-10-CM

## 2022-03-01 RX ORDER — FLUTICASONE PROPIONATE 50 MCG
SPRAY, SUSPENSION (ML) NASAL
Refills: 2 | OUTPATIENT
Start: 2022-03-01

## 2022-07-25 RX ORDER — ROSUVASTATIN CALCIUM 40 MG/1
40 TABLET, COATED ORAL EVERY EVENING
COMMUNITY
Start: 2022-06-07 | End: 2022-12-04

## 2022-07-25 RX ORDER — LOSARTAN POTASSIUM 50 MG/1
50 TABLET ORAL DAILY
COMMUNITY
Start: 2022-06-07 | End: 2022-12-04

## 2022-07-25 NOTE — PROGRESS NOTES
C-diff Questionnaire:     * Admitted with diarrhea? [] YES    [x]  NO     *Prior history of C-Diff. In last 3 months? [] YES    [x]  NO     *Antibiotic use in the past 6-8 weeks? [x]  NO    []  YES      If yes, which: REASON_________________     *Prior hospitalization or nursing home in the last month? []  YES    [x]  NO     [facility]FETY FIRST. .call before you fall    [address] time_____1130_______        Surgery time______1300______    Do not eat or drink anything after 12:00 midnight prior to your surgery. This includes water chewing gum, mints and ice chips- the Day of Surgery. Follow your MD/Surgeons pre-procedure instructions regarding your medications   You may brush your teeth and gargle the morning of your surgery, but do not swallow the water     Please see your family doctor/pediatrician for a history and physical and/or questions concerning medications. Bring any test results/reports from your physicians office. If you are under the care of a heart doctor or specialist doctor, please be aware that you may be asked to them for clearance    You may be asked to stop blood thinners such as Coumadin, Plavix, Fragmin, Lovenox, etc., or any anti-inflammatories such as:  Aspirin, Ibuprofen, Advil, Naproxen prior to your surgery. We also ask that you stop any OTC medications such as fish oil, vitamin E, glucosamine, garlic, Multivitamins, COQ 10, etc.  MAY TAKE TYLENOL  We ask that you do not smoke 24 hours prior to surgery  We ask that you do not  drink any alcoholic beverages 24 hours prior to surgery     You must make arrangements for a responsible adult to take you home after your surgery. For your safety you will not be allowed to leave alone or drive yourself home. Your surgery will be cancelled if you do not have a ride home.      Also for your safety, it is strongly suggested that someone stay with you the first 24 hours after your surgery. A parent or legal guardian must accompany a child scheduled for surgery and plan to stay at the hospital until the child is discharged. Please do not bring other children with you. For your comfort, please wear simple loose fitting clothing to the hospital.  Please do not bring valuables. Do not wear any make-up or nail polish on your fingers or toes. For your safety, please do not wear any jewelry or body piercing's on the day of surgery. All jewelry must be removed. If you have dentures, they will be removed before going to operating room. For your convenience, we will provide you with a container. If you wear contact lenses or glasses, they will be removed, please bring a case for them. If you have a living will and a durable power of  for healthcare, please bring in a copy. As part of our patient safety program to minimize surgical site infections, we ask you to do the following:    Please notify your surgeon if you develop any illness between         now and the day of your surgery. This includes a cough, cold, fever, sore throat, nausea,         or vomiting, and diarrhea, etc.   Please notify your surgeon if you experience dizziness, shortness         of breath or blurred vision between now and the time of your surgery. Do not shave your operative site 96 hours prior to surgery. For face and neck surgery, men may use an electric razor 48 hours   prior to surgery. You may shower the night before surgery or the morning of   your surgery with an antibacterial soap. You will need to bring a photo ID and insurance card     If you use a C-pap or Bi-pap machine, please bring your machine with you to the hospital     Our goal is to provide you with excellent care, therefore, visitors will be limited to so that we may focus on providing this care for you. Please contact your surgeon office, if you have any further questions. [facility] phone number:  [phone] PAT fax number:  [phone]    Please note these are generalized instructions for all surgical cases, you may be provided with more specific instructions according to your surgery.

## 2022-08-02 ENCOUNTER — ANESTHESIA EVENT (OUTPATIENT)
Dept: ENDOSCOPY | Age: 72
End: 2022-08-02
Payer: MEDICARE

## 2022-08-03 ENCOUNTER — HOSPITAL ENCOUNTER (OUTPATIENT)
Age: 72
Setting detail: OUTPATIENT SURGERY
Discharge: HOME | End: 2022-08-03
Attending: INTERNAL MEDICINE | Admitting: INTERNAL MEDICINE
Payer: MEDICARE

## 2022-08-03 ENCOUNTER — ANESTHESIA (OUTPATIENT)
Dept: ENDOSCOPY | Age: 72
End: 2022-08-03
Payer: MEDICARE

## 2022-08-03 VITALS
OXYGEN SATURATION: 97 % | SYSTOLIC BLOOD PRESSURE: 106 MMHG | WEIGHT: 218.34 LBS | HEART RATE: 65 BPM | RESPIRATION RATE: 14 BRPM | TEMPERATURE: 97 F | HEIGHT: 75 IN | DIASTOLIC BLOOD PRESSURE: 69 MMHG | BODY MASS INDEX: 27.15 KG/M2

## 2022-08-03 LAB
GLUCOSE BLD-MCNC: 108 MG/DL (ref 70–99)
PERFORMED ON: ABNORMAL

## 2022-08-03 PROCEDURE — 7100000000 HC PACU RECOVERY - FIRST 15 MIN: Performed by: INTERNAL MEDICINE

## 2022-08-03 PROCEDURE — 7100000011 HC PHASE II RECOVERY - ADDTL 15 MIN: Performed by: INTERNAL MEDICINE

## 2022-08-03 PROCEDURE — 2580000003 HC RX 258: Performed by: ANESTHESIOLOGY

## 2022-08-03 PROCEDURE — 3609018500 HC EGD US SCOPE W/ADJACENT STRUCTURES: Performed by: INTERNAL MEDICINE

## 2022-08-03 PROCEDURE — 7100000010 HC PHASE II RECOVERY - FIRST 15 MIN: Performed by: INTERNAL MEDICINE

## 2022-08-03 PROCEDURE — 7100000001 HC PACU RECOVERY - ADDTL 15 MIN: Performed by: INTERNAL MEDICINE

## 2022-08-03 PROCEDURE — 3700000001 HC ADD 15 MINUTES (ANESTHESIA): Performed by: INTERNAL MEDICINE

## 2022-08-03 PROCEDURE — 3700000000 HC ANESTHESIA ATTENDED CARE: Performed by: INTERNAL MEDICINE

## 2022-08-03 PROCEDURE — 2709999900 HC NON-CHARGEABLE SUPPLY: Performed by: INTERNAL MEDICINE

## 2022-08-03 PROCEDURE — 2500000003 HC RX 250 WO HCPCS

## 2022-08-03 PROCEDURE — 6360000002 HC RX W HCPCS

## 2022-08-03 RX ORDER — PROPOFOL 10 MG/ML
INJECTION, EMULSION INTRAVENOUS PRN
Status: DISCONTINUED | OUTPATIENT
Start: 2022-08-03 | End: 2022-08-03 | Stop reason: SDUPTHER

## 2022-08-03 RX ORDER — PROPOFOL 10 MG/ML
INJECTION, EMULSION INTRAVENOUS CONTINUOUS PRN
Status: DISCONTINUED | OUTPATIENT
Start: 2022-08-03 | End: 2022-08-03 | Stop reason: SDUPTHER

## 2022-08-03 RX ORDER — SODIUM CHLORIDE 0.9 % (FLUSH) 0.9 %
5-40 SYRINGE (ML) INJECTION EVERY 12 HOURS SCHEDULED
Status: DISCONTINUED | OUTPATIENT
Start: 2022-08-03 | End: 2022-08-03 | Stop reason: HOSPADM

## 2022-08-03 RX ORDER — ONDANSETRON 2 MG/ML
4 INJECTION INTRAMUSCULAR; INTRAVENOUS
Status: DISCONTINUED | OUTPATIENT
Start: 2022-08-03 | End: 2022-08-03 | Stop reason: HOSPADM

## 2022-08-03 RX ORDER — SODIUM CHLORIDE 0.9 % (FLUSH) 0.9 %
5-40 SYRINGE (ML) INJECTION PRN
Status: DISCONTINUED | OUTPATIENT
Start: 2022-08-03 | End: 2022-08-03 | Stop reason: HOSPADM

## 2022-08-03 RX ORDER — SODIUM CHLORIDE 9 MG/ML
INJECTION, SOLUTION INTRAVENOUS CONTINUOUS
Status: DISCONTINUED | OUTPATIENT
Start: 2022-08-03 | End: 2022-08-03 | Stop reason: HOSPADM

## 2022-08-03 RX ORDER — SODIUM CHLORIDE 9 MG/ML
INJECTION, SOLUTION INTRAVENOUS PRN
Status: DISCONTINUED | OUTPATIENT
Start: 2022-08-03 | End: 2022-08-03 | Stop reason: HOSPADM

## 2022-08-03 RX ORDER — LIDOCAINE HYDROCHLORIDE 20 MG/ML
INJECTION, SOLUTION EPIDURAL; INFILTRATION; INTRACAUDAL; PERINEURAL PRN
Status: DISCONTINUED | OUTPATIENT
Start: 2022-08-03 | End: 2022-08-03 | Stop reason: SDUPTHER

## 2022-08-03 RX ADMIN — PROPOFOL 50 MG: 10 INJECTION, EMULSION INTRAVENOUS at 14:07

## 2022-08-03 RX ADMIN — PROPOFOL 50 MG: 10 INJECTION, EMULSION INTRAVENOUS at 13:59

## 2022-08-03 RX ADMIN — PROPOFOL 100 MG: 10 INJECTION, EMULSION INTRAVENOUS at 13:55

## 2022-08-03 RX ADMIN — PROPOFOL 50 MG: 10 INJECTION, EMULSION INTRAVENOUS at 13:57

## 2022-08-03 RX ADMIN — PROPOFOL 180 MCG/KG/MIN: 10 INJECTION, EMULSION INTRAVENOUS at 13:56

## 2022-08-03 RX ADMIN — SODIUM CHLORIDE: 9 INJECTION, SOLUTION INTRAVENOUS at 11:47

## 2022-08-03 RX ADMIN — LIDOCAINE HYDROCHLORIDE 100 MG: 20 INJECTION, SOLUTION EPIDURAL; INFILTRATION; INTRACAUDAL; PERINEURAL at 13:55

## 2022-08-03 ASSESSMENT — PAIN SCALES - GENERAL: PAINLEVEL_OUTOF10: 0

## 2022-08-03 ASSESSMENT — LIFESTYLE VARIABLES: SMOKING_STATUS: 0

## 2022-08-03 NOTE — PROGRESS NOTES
Phys at side answers questions regarding exam.  Info written on instructions since patient refusing family talking to phys.

## 2022-08-03 NOTE — PROGRESS NOTES
Transfers to chair. Does not want phys to talk to family and waits to talk with phys. Sips soda and snacks.

## 2022-08-03 NOTE — PROGRESS NOTES
Patient admitted to PACU from [facility]. Patient asleep. Resp easy unlabored on room air O2 with SAO2 98%. Abdomen rounded soft. VSS. IV patent to right AC.

## 2022-08-03 NOTE — PROGRESS NOTES
Patient opens eyes to name, very drowsy. Resp easy unlabored on room air O2 with SaO2 100%. Abdomen rounded soft. VSS.

## 2022-08-03 NOTE — PROGRESS NOTES
Patient awake and alert. Resp easy unlabored on room air O2 with SO2 98%. Abdomen rounded soft. VSS. IV patent. Patient denies C/O pain or nausea. Patient stable to transfer to ACU  for phase II.

## 2022-08-03 NOTE — H&P
Pre-operative History and Physical    Patient: Courtney Noyola  : 1950  Acct#:     HISTORY OF PRESENT ILLNESS:    The patient is a 80 y.o. male who presents with abdominal pain with negative work-up with EGD showing gastritis with negative h. pylori biopsies, normal LFT's and lipase. CT without contrast showed prior cholecystectomy. Could not have contrast b/c of CKD so planning EUS for further evaluation of the abdominal pain. Past Medical History:        Diagnosis Date    CAD (coronary artery disease)     Cervical spondylosis 2020    Chronic renal insufficiency, stage 2 (mild) 2016    Head pain cephalgia 2014    Hyperlipidemia     Hypertension     Perennial allergic rhinitis 2015    Pre-diabetes       Past Surgical History:        Procedure Laterality Date    CARDIAC SURGERY      Stents    CHOLECYSTECTOMY      COLONOSCOPY      CORONARY ANGIOPLASTY WITH STENT PLACEMENT      KNEE ARTHROSCOPY      LEFT    ROTATOR CUFF REPAIR      right, and left    TONSILLECTOMY        Medications Prior to Admission:   No current facility-administered medications on file prior to encounter. Current Outpatient Medications on File Prior to Encounter   Medication Sig Dispense Refill    rosuvastatin (CRESTOR) 40 MG tablet Take 40 mg by mouth every evening      losartan (COZAAR) 50 MG tablet Take 50 mg by mouth in the morning. hydrochlorothiazide (HYDRODIURIL) 25 MG tablet Take 25 mg by mouth in the morning. aspirin 81 MG EC tablet Take 81 mg by mouth daily.         Psyllium (METAMUCIL PO) Take by mouth daily 1 TABLESPOON DAILY          Allergies:  Ibuprofen    Social History:   Social History     Socioeconomic History    Marital status:      Spouse name: Not on file    Number of children: Not on file    Years of education: Not on file    Highest education level: Not on file   Occupational History    Not on file   Tobacco Use    Smoking status: Never    Smokeless tobacco: Never   Vaping Use    Vaping Use: Never used   Substance and Sexual Activity    Alcohol use: No    Drug use: Never    Sexual activity: Not on file   Other Topics Concern    Not on file   Social History Narrative    Not on file     Social Determinants of Health     Financial Resource Strain: Not on file   Food Insecurity: Not on file   Transportation Needs: Not on file   Physical Activity: Not on file   Stress: Not on file   Social Connections: Not on file   Intimate Partner Violence: Not on file   Housing Stability: Not on file      Family History:       Problem Relation Age of Onset    High Blood Pressure Mother     Emphysema Mother     High Blood Pressure Father     Cancer Father     No Known Problems Brother     No Known Problems Brother         PHYSICAL EXAM:      BP (!) 141/81   Pulse 58   Temp 97.2 °F (36.2 °C) (Temporal)   Resp 20   Ht 6' 3\" (1.905 m)   Wt 218 lb 5.5 oz (99 kg)   SpO2 96%   BMI 27.29 kg/m²  I        Heart:  RRR    Lungs:  CTA b    Abdomen:  S/NT/ND/+BS      ASSESSMENT AND PLAN:  ASA: per anesthesia  Mallampati: per anesthesia  1. Patient is a 80 y.o. male here for EGD and EUS   2. Procedure options, risks and benefits reviewed with the patient. The patient expresses understanding.     Stas Garnica

## 2022-08-03 NOTE — ANESTHESIA POSTPROCEDURE EVALUATION
Department of Anesthesiology  Postprocedure Note    Patient: Esther Magallon  MRN: [id number]  YOB: 1950  Date of evaluation: 8/3/2022      Procedure Summary     Date: 08/03/22 Room / Location: [address] [place] [address]    Anesthesia Start: 5355 Anesthesia Stop: 5756    Procedure: ENDOSCOPIC ULTRASOUND EXAM Diagnosis:       Abdominal pain, unspecified abdominal location      (ABDOMINAL PAIN)    Surgeons: Karyna Farmer MD Responsible Provider: Guerita Herbert MD    Anesthesia Type: MAC ASA Status: 3          Anesthesia Type: MAC    Jennifer Phase I: Jennifer Score: 10    Jennifer Phase II: Jennifer Score: 10      Anesthesia Post Evaluation    Patient location during evaluation: PACU  Patient participation: complete - patient participated  Level of consciousness: awake and alert  Airway patency: patent  Nausea & Vomiting: no nausea and no vomiting  Complications: no  Cardiovascular status: hemodynamically stable and blood pressure returned to baseline  Respiratory status: spontaneous ventilation and nonlabored ventilation  Hydration status: stable  Comments: [facility] was seen resting comfortably following procedure. Anticipate return to [facility] for planned discharge home with .

## 2022-08-03 NOTE — PROGRESS NOTES
Procedure Performed: ENDOSCOPIC ULTRASOUND With: Diagnostic    Fine Needle Aspiration of:   NOT APPLICABLE           EUS scope balloon removed intact and verified by ROC Kelsey RN and KATERINA Pichardo

## 2022-08-03 NOTE — OP NOTE
Endoscopy Note    Patient: Seth Yusuf   : 1950  Acct#:     Procedure: Endoscopic ultrasound  Doppler of mesenteric vessels    Surgeon:  Librado Torres MD, MD    Referring Physician:  Zhang Hunter    Anesthesia:  MAC per Anesthesia. Indications: This is a 80y.o. year old male who presents today with abdominal pain with negative work-up with EGD showing gastritis with negative h. pylori biopsies, normal LFT's and lipase. CT without contrast showed prior cholecystectomy. Could not have contrast b/c of CKD so planning EUS for further evaluation of the abdominal pain. Consent: Risks, benefits, and alternatives were explained and informed consent was obtained. Monitoring:  Patient was monitored with continuous pulse oximetry, telemetry, and intermittent blood pressures. Details of the Procedure: The patient was then taken to the endoscopy suite. A time-out was performed. The patient and staff were in agreement as to the correct patient and procedure. The above anesthesia was administered by the anesthesia department. The patient was placed in the left lateral position. Findings:   Limited views of esophagus normal without Boo's or reflux changes  Stomach normal.  Duodenum normal.   Major Papilla: Endoscopically, the major papilla was normal.  Endosonographically, the major papilla appeared normal  Pancreas: The pancreatic duct measured 3.2mm off the major papilla, 2.8mm in the head of the pancreas, 1.1mm in the body of the pancreas, and 1.0mm in the tail of the pancreas. There was a hyperechoic duct margin. The pancreatic parenchyma had hyperechoic strands, hyperechoic foci, and hypoechoic lobularity. Bile duct: The bile duct was 4.0mm off the major papilla and 8.6mm in the mid bile duct. There were no shadowing stones or strictures. Gallbladder: The gallbladder was surgically absent. Liver:  The liver appeared normal.  L adrenal: Normal.  Doppler evaluation of the celiac artery, splenic artery, hepatic artery, superior mesenteric artery, superior mesenteric vein, portal vein, and splenic vein was normal.    The duodenum and stomach were decompressed and the scope was withdrawn from the patient. The patient tolerated the procedure well and was taken to the post anesthesia care unit in good condition. Doppler Interpretation:  Doppler evaluation was performed and interpreted on the spot by the endoscopist without the presence of a radiologist.      Specimens taken: none  Estimated blood loss: minimal      Impression:  4/9 criteria for chronic pancreatitis, indeterminate for chronic pancreatitis (>5 criteria is probable, <3 is probably not chronic pancreatitis and 4 is indeterminate). No mass lesions in the pancreas. Recommendations:  1. Clear liquid diet advance as tolerated. 2.  Will check stool elastase and stool fat. 3. Continue to avoid alcohol and smoking. 4.  Office follow-up in 6 weeks.       Stas Garnica

## 2022-08-03 NOTE — PROGRESS NOTES
Pt waits to talk to Dr Kike Jalloh. Endo informed in room 2 in phase II waiting to talk to phys. Pt informed phys would be in after procedure.

## 2022-08-22 ENCOUNTER — OFFICE VISIT (OUTPATIENT)
Dept: NEUROLOGY | Age: 72
End: 2022-08-22
Payer: MEDICARE

## 2022-08-22 VITALS
HEIGHT: 75 IN | SYSTOLIC BLOOD PRESSURE: 109 MMHG | BODY MASS INDEX: 24.62 KG/M2 | WEIGHT: 198 LBS | DIASTOLIC BLOOD PRESSURE: 63 MMHG | HEART RATE: 62 BPM

## 2022-08-22 DIAGNOSIS — M54.16 LUMBAR RADICULOPATHY: Primary | ICD-10-CM

## 2022-08-22 DIAGNOSIS — G60.9 IDIOPATHIC PERIPHERAL NEUROPATHY: ICD-10-CM

## 2022-08-22 DIAGNOSIS — M79.604 RIGHT LEG PAIN: ICD-10-CM

## 2022-08-22 PROCEDURE — 1123F ACP DISCUSS/DSCN MKR DOCD: CPT | Performed by: PSYCHIATRY & NEUROLOGY

## 2022-08-22 PROCEDURE — 3017F COLORECTAL CA SCREEN DOC REV: CPT | Performed by: PSYCHIATRY & NEUROLOGY

## 2022-08-22 PROCEDURE — 99204 OFFICE O/P NEW MOD 45 MIN: CPT | Performed by: PSYCHIATRY & NEUROLOGY

## 2022-08-22 PROCEDURE — G8420 CALC BMI NORM PARAMETERS: HCPCS | Performed by: PSYCHIATRY & NEUROLOGY

## 2022-08-22 PROCEDURE — G8427 DOCREV CUR MEDS BY ELIG CLIN: HCPCS | Performed by: PSYCHIATRY & NEUROLOGY

## 2022-08-22 PROCEDURE — 1036F TOBACCO NON-USER: CPT | Performed by: PSYCHIATRY & NEUROLOGY

## 2022-08-22 NOTE — PROGRESS NOTES
NEUROLOGY CONSULTATION     Chief Complaint   Patient presents with    New Patient     neuropathy       HISTORY OF PRESENT ILLNESS :    Oscar Jett is a 80 y.o. male who is referred by Dr. Richmond Coffee   History was obtained from patient  Patient was referred for pain in his right leg. Patient has known idiopathic peripheral neuropathy. In fact done EMG studies on him more than 5 years ago and found a mild peripheral neuropathy. Patient has very well-controlled diabetes mellitus which is probably the etiology for his neuropathy. Patient complains of some pain around the right sacroiliac joint and in his lumbar spine. He has shooting pain from the right leg into the foot. Sometimes it is burning. He denies any similar symptoms on the left side. Patient also has some abdominal pain and stomach problems and he is getting a GI work-up for that.   Patient is under the care of her endocrinologist.    REVIEW OF SYSTEMS    Constitutional:  []   Chills   []  Fatigue   []  Fevers   []  Malaise   []  Weight loss     [x] Denies all of the above    Eyes:  []  Double vision   []  Blurry vision     [x] Denies all of the above    Ears, nose, mouth, throat, and face:   [] Hearing loss    []   Hoarseness      []  Snoring    []  Tinnitus       [x] Denies all of the above     Respiratory:   []  Cough    []  Shortness of breath         [x] Denies all of the above     Cardiovascular:   []  Chest pain    []  Exertional chest pressure/discomfort           [] Palpitations    []  Syncope     [x] Denies all of the above    Gastrointestinal:   []  Abdominal pain   []  Constipation    []  Diarrhea    []   Dysphagia                      [x] Denies all of the above    Genitourinary:      []  Frequency   []  Hematuria     []  Urinary incontinence           [x] Denies all of the above     Hematologic/lymphatic:  []  Bleeding    []  Easy bruising   [] is intact to pin prick and light touch  VII: Facial strength and movements: intact and symmetric smile,cheek puffing and eyebrow elevation  VIII: Hearing:  Intact to finger rub bilaterally  IX: Palate  elevation is symmetric  XI: Shoulder shrug is intact  XII: Tongue movements are normal  Motor:  Muscle tone and bulk are normal.   Strength is symmetrical 5/5 in all four extremities. Sensory: Decreased light touch in the distal lower extremities. Coordination:  Normal  Finger to Nose and Heel to Shin bilaterally    . Reflexes:  DTR 1 in the upper extremities and diminished in the lower extremities  Plantar response: Flexor bilaterally  Gait: Gait and station is normal. Patient can toe/ heel and tandem walk without difficulty  Romberg: negative  Vascular: No carotid bruit bilaterally      DATA:  Lab results from outside hospital were reviewed. IMPRESSION :  Symptoms are suggestive of lumbar radiculopathy. Patient has mild peripheral neuropathy which is probably due to his well-controlled diabetes. She also has abdominal pain for which she is getting a GI work-up. Patient had been on gabapentin about 5 years ago. RECOMMENDATIONS :  Discussed with patient  I think that we should start with physical therapy for his lower back pain  I will see him back in 2 months. If there is no improvement then we can consider MRI of the lumbar spine. At some point we will decide if he needs repeat EMG and nerve conduction studies of his lower extremities. Thank you for this consultation. Please note a portion of this chart was generated using dragon dictation software. Although every effort was made to ensure the accuracy of this automated transcription, some errors in transcription may have occurred.

## 2022-08-30 ENCOUNTER — HOSPITAL ENCOUNTER (OUTPATIENT)
Dept: PHYSICAL THERAPY | Age: 72
Setting detail: THERAPIES SERIES
Discharge: HOME | End: 2022-08-30
Payer: MEDICARE

## 2022-08-30 PROCEDURE — 97161 PT EVAL LOW COMPLEX 20 MIN: CPT

## 2022-08-30 PROCEDURE — 97140 MANUAL THERAPY 1/> REGIONS: CPT

## 2022-08-30 NOTE — PLAN OF CARE
[address] [address], [address]  Phone: [phone]   Fax: [phone]     Physical Therapy Certification    Dear Darryle Net, MD  ,    We had the pleasure of evaluating the following patient for physical therapy services at [address] [place] [address]. A summary of our findings can be found in the initial assessment below. This includes our plan of care. If you have any questions or concerns regarding these findings, please do not hesitate to contact me at the office phone number checked above. Thank you for the referral.       Physician Signature:_______________________________Date:__________________  By signing above (or electronic signature), therapists plan is approved by physician      Patient: Esther Magallon   : 1950   MRN: [id number]  Referring Physician: Darryle Net, MD        Evaluation Date: 2022      Medical Diagnosis Information:  Diagnosis: M54.16 (ICD-10-CM) - Lumbar radiculopathy  M79.604 (ICD-10-CM) - Right leg pain   PT diagnosis:  ow back pain, decreased lumbar ROM, soft tissue mobility restrictions   Insurance information: PT Insurance Information: [employer], no ded, $40 copay, auth thru [employer]here     Precautions/ Contra-indications: none  Latex Allergy:  [x]NO      []YES  Preferred Language for Healthcare:   [x]English       []Other:    C-SSRS Triggered by Intake questionnaire (Past 2 wk assessment ):   [x] No, Questionnaire did not trigger screening.   [] Yes, Patient intake triggered C-SSRS Screening     [] Completed, no further action required. [] Completed, PCP notified via Epic    SUBJECTIVE: Patient stated complaint: Pt had pain x6-7 wks in stomach that went around to back and had mult surgeries for endoscopies. Per pt, MD wants MRI but needs PT first. Pt reports he also has pain that goes into R lower leg and makes his \"foot jump\" when he lays down on his back. Pt reports back pain is provoked when doing a sit up. Fear avoidance: I should not do physical activities that (might) make my pain worse   [x] True   [] False     Relevant Medical History:DM, cardiac stent 2011, HTN  Functional Outcome: FOTO physical FS primary measure score = 83; Risk adjusted = 55    Pain Scale: 2-8/10  Easing factors: nothing  Provocative factors: sit ups     Type: [x]Constant   []Intermittent  [x]Radiating []Localized []other:     Numbness/Tingling: none    Occupation/School: retired    Living Status/Prior Level of Function: Prior to this injury / incident, pt was independent with ADLs and IADLs,     OBJECTIVE:   Palpation: TTP R parapspinals and QL    Functional Mobility/Transfers:  WNL    Posture: WNL    Inspection: WNL    Gait: (include devices/WB status) WNL    Bandages/Dressings/Incisions: NA  Dermatomes Normal Abnormal Comments   inguinal area (L1)       anterior mid-thigh (L2) x     distal ant thigh/med knee (L3) x     medial lower leg and foot (L4) x     lateral lower leg and foot (L5) x     posterior calf (S1) x     medial calcaneus (S2) x         Reflexes Normal Abnormal Comments   S1-2 Seated achilles      S1-2 Prone knee bend      L3-4 Patellar tendon      Clonus      Babinski          ROM  Comments   Lumbar Flex 100%    Lumbar Ext 100%, LBP      ROM LEFT RIGHT Comments   Lumbar Side Bend 100 100 Pain in R low back with both   Lumbar Rotation 90 90    Hip Flexion      Hip Abd      Hip ER WNL WNL    Hip IR WNL WNL    Hip Extension      Knee Ext      Knee Flex      Hamstring Flex      Piriformis                      Joint mobility:    [x]Normal    []Hypo   []Hyper      Strength / Myotomes LEFT RIGHT Comments   Multifidus      Transverse Ab      Hip Flexors (L1-2) 5 5    Hip Abductors 4+ 4+    Hip Extensors 5 5    Hip Internal Rotators      Hip External Rotators      Quads (L2-4) 5 5    Hamstrings  5 5    Ankle Plantarflexion (S1-2)      Ankle Dorsiflexion (L4-5)      Ankle Inversion      Ankle Eversion (S1-2)      Great Toe Extension (L5)        TA Muscle Contraction Scale    Criteria                                               Score  Quality of Contraction   Not Present      [] 0   Rapid, Superificial     [] 1   Just Perceptible     [] 2     Gentle, Slow      [x] 3    Substitution   Resting       [] 0   Moderate to Strong     [] 1    Subtle Perceptible     [] 2   None       [x] 3    Symmetry of Contraction   Unilateral       [] 0   Bilateral/Asymmetrical     [] 1   Symmetrical       [x] 2    Breathing     Inability/Difficulty Breathing during contraction [] 0   Able to hold contraction while Breathing  [x] 1    Holding   Able to Hold Contraction <10 s   [] 0   Able to Hold Contraction >10 s   [x] 1      10__/10  Adapted from [employer], Copyright 2009        Neural dynamic tension testing Normal Abnormal Comments   Slump Test  - Degree of knee flexion:  x     SLR       0-30      30-70      Femoral nerve (L2-4)          Orthopedic Special Tests:    Normal Abnormal N/A Comments   Toe walk         Heel Walk       Fwd Bend-aberrant or innominate mvmt)       Standing Flexion test       Trendelenburg       Kemps/Quadrant       Stork       VINOD/Jhonny       Hip scour       SLR x      Crossed SLR x      Supine to sit       Hip thrust       SI distraction/compression       PA/Spring x      Prone Instability test       Prone knee bend       Sacral Spring/thrust x                 [x] Patient history, allergies, meds reviewed. Medical chart reviewed. See intake form. Review Of Systems (ROS):  [x]Performed Review of systems (Integumentary, CardioPulmonary, Neurological) by intake and observation. Intake form has been scanned into medical record. Patient has been instructed to contact their primary care physician regarding ROS issues if not already being addressed at this time.       Co-morbidities/Complexities (which will affect course of rehabilitation):   []None        []Hx of COVID   Arthritic conditions []Rheumatoid arthritis (M05.9)  []Osteoarthritis (M19.91)  []Gout   Cardiovascular conditions   [x]Hypertension (I10)  []Hyperlipidemia (E78.5)  []Angina pectoris (I20)  []Atherosclerosis (I70)  []Pacemaker  [x]Hx of CABG/stent/  cardiac surgeries   Musculoskeletal conditions   []Disc pathology   []Congenital spine pathologies   []Osteoporosis (M81.8)  []Osteopenia (M85.8)  []Scoliosis       Endocrine conditions   []Hypothyroid (E03.9)  []Hyperthyroid Gastrointestinal conditions   []Constipation (W37.68)   Metabolic conditions   []Morbid obesity (E66.01)  [x]Diabetes type 1(E10.65) or 2 (E11.65)   []Neuropathy (G60.9)     Cardio/Pulmonary conditions   []Asthma (J45)  []Coughing   []COPD (J44.9)  []CHF  []A-fib   Psychological Disorders  []Anxiety (F41.9)  []Depression (F32.9)   []Other:   Developmental Disorders  []Autism (F84.0)  []CP (G80)  []Down Syndrome (Q90.9)  []Developmental delay     Neurological conditions  []Prior Stroke (I69.30)  []Parkinson's (G20)  []Encephalopathy (G93.40)  []MS (G35)  []Post-polio (G14)  []SCI  []TBI  []ALS Other conditions  []Fibromyalgia (M79.7)  []Vertigo  []Syncope  []Kidney Failure  []Cancer      []currently undergoing                treatment  []Pregnancy  []Incontinence   Prior surgeries  []involved limb  []previous spinal surgery  [] section birth  []hysterectomy  []bowel / bladder surgery  []other relevant surgeries   []Other:               Barriers to/and or personal factors that will affect rehab potential:              []Age  []Sex    []Smoker              []Motivation/Lack of Motivation                        []Co-Morbidities              []Cognitive Function, education/learning barriers              []Environmental, home barriers              []profession/work barriers  []past PT/medical experience  []other:  Justification:     Falls Risk Assessment (30 days):   [x] Falls Risk assessed and no intervention required.   [] Falls Risk assessed and Patient requires intervention due to being higher risk   TUG score (>12s at risk):     [] Falls education provided, including         ASSESSMENT: 81 y/o male presents with R low back pain reproduced with palpation of R QL. Pt demo's good LE strength and TA activation but notes LBP with ext and SB. Mild lumbar rotation deficit. Pt would benefit from skilled PT with focus on dynamic core stability and soft tissue mobility. Pt educated on benefits and indications for DN; pt would benefit from DN to R QL and lumbar paraspinals. Functional Impairments:     []Noted lumbar/proximal hip hypomobility   []Noted lumbosacral and/or generalized hypermobility   [x]Decreased Lumbosacral/hip/LE functional ROM   []Decreased core/proximal hip strength and neuromuscular control    [x]Decreased LE functional strength    []Abnormal reflexes/sensation/myotomal/dermatomal deficits  []Reduced balance/proprioceptive control    []other:      Functional Activity Limitations (from functional questionnaire and intake)   []Reduced ability to tolerate prolonged functional positions   []Reduced ability or difficulty with changes of positions or transfers between positions   []Reduced ability to maintain good posture and demonstrate good body mechanics with sitting, bending, and lifting   []Reduced ability to sleep   [] Reduced ability or tolerance with driving and/or computer work   []Reduced ability to perform lifting, reaching, carrying tasks   []Reduced ability to squat   []Reduced ability to forward bend   []Reduced ability to ambulate prolonged functional periods/distances/surfaces   []Reduced ability to ascend/descend stairs   [x]other:  pain with all ADLs     Participation Restrictions   [x]Reduced participation in self care activities   [x]Reduced participation in home management activities   []Reduced participation in work activities   [x]Reduced participation in social activities.    [x]Reduced participation in sport/recreational activities. Classification:   []Signs/symptoms consistent with Lumbar instability/stabilization subgroup. []Signs/symptoms consistent with Lumbar mobilization/manipulation subgroup, myotomes and dermatomes intact. Meets manipulation criteria. []Signs/symptoms consistent with Lumbar direction specific/centralization subgroup   []Signs/symptoms consistent with Lumbar traction subgroup     []Signs/symptoms consistent with lumbar facet dysfunction   []Signs/symptoms consistent with lumbar stenosis type dysfunction   []Signs/symptoms consistent with nerve root involvement including myotome & dermatome dysfunction   []Signs/symptoms consistent with post-surgical status including: decreased ROM, strength and function.    []signs/symptoms consistent with pathology which may benefit from Dry needling     [x]other: trigger point associated pain     Prognosis/Rehab Potential:      []Excellent   [x]Good    []Fair   []Poor    Tolerance of evaluation/treatment:    []Excellent   [x]Good    []Fair   []Poor     Physical Therapy Evaluation Complexity Justification  [x] A history of present problem with:  [] no personal factors and/or comorbidities that impact the plan of care;  [x]1-2 personal factors and/or comorbidities that impact the plan of care  []3 personal factors and/or comorbidities that impact the plan of care  [x] An examination of body systems using standardized tests and measures addressing any of the following: body structures and functions (impairments), activity limitations, and/or participation restrictions;:  [x] a total of 1-2 or more elements   [] a total of 3 or more elements   [] a total of 4 or more elements   [x] A clinical presentation with:  [x] stable and/or uncomplicated characteristics   [] evolving clinical presentation with changing characteristics  [] unstable and unpredictable characteristics;   [x] Clinical decision making of [x] low, [] moderate, [] high complexity using standardized patient assessment instrument and/or measurable assessment of functional outcome. [] EVAL (LOW) 28149 (typically 15 minutes face-to-face)  [] EVAL (MOD) 76403 (typically 30 minutes face-to-face)  [] EVAL (HIGH) 61869 (typically 45 minutes face-to-face)  [] RE-EVAL     PLAN: Begin PT focusing on: proximal hip mobilizations, LB mobs, LB core activation, proximal hip activation, and HEP    Frequency/Duration:  2 days per week for 6 Weeks:  Interventions:  [x]  Therapeutic exercise including: strength training, ROM, for LE, Glutes and core   [x]  NMR activation and proprioception for glutes , LE and Core   [x]  Manual therapy as indicated for Hip complex, LE and spine to include: Dry Needling/IASTM, STM, PROM, Gr I-IV mobilizations, manipulation. [x]  Modalities as needed that may include: thermal agents, E-stim, Biofeedback, US, iontophoresis as indicated  [x]  Patient education on joint protection, postural re-education, activity modification, progression of HEP. HEP instruction: Written HEP instructions provided and reviewed. Access Code: [email or web address]  URL: [employer]/  Date: 08/30/2022  Prepared by: Son Rodriguez    Exercises  Sidelying Thoracic Rotation with Open Book - 1 x daily - 7 x weekly - 3 sets - 10 reps      GOALS:  Patient stated goal: eliminate LBP  [] Progressing: [] Met: [] Not Met: [] Adjusted    Therapist goals for Patient:   Short Term Goals: To be achieved in: 2 weeks  1. Independent in HEP and progression per patient tolerance, in order to prevent re-injury. [] Progressing: [] Met: [] Not Met: [] Adjusted  2. Patient will have a decrease in pain to 0/10 with sitting to improve ability to rest without pain. [] Progressing: [] Met: [] Not Met: [] Adjusted    Long Term Goals: To be achieved in: 6 weeks  1. FOTO score of at least 86 to assist with reaching prior level of function with housework and self care. [] Progressing: [] Met: [] Not Met: [] Adjusted  2.  Patient will demonstrate increased lumbar rotation to 100% and pain free lumbar ext for improved functional mobility to don seat belt. [] Progressing: [] Met: [] Not Met: [] Adjusted  3. Patient will demonstrate an increase in lateral hip Strength to 5/5 to promote improve lumbar stability with gait (to avoid hip drop). [] Progressing: [] Met: [] Not Met: [] Adjusted  4. Patient will return to functional activities including performing core exercises such as crunches without increased symptoms or restriction. [] Progressing: [] Met: [] Not Met: [] Adjusted  5. Pt can walk up to 30 min without pain for community ambulation. [] Progressing: [] Met: [] Not Met: [] Adjusted     Electronically signed by:   Miranda Wilcox PT DPT ATC

## 2022-08-30 NOTE — FLOWSHEET NOTE
[address] [place] [address] Link  Phone: [phone]   Fax: [phone]    Physical Therapy Daily Treatment Note    Date:  2022     Patient Name:  Farrukh Mosqueda  :  1950  MRN: [id number]  Medical Diagnosis:  Radiculopathy, lumbar region [M54.16]  Pain in right leg [M79.604]  Treatment Diagnosis: low back pain, decreased lumbar ROM, soft tissue mobility restrictions  Insurance/Certification information:  PT Insurance Information: [employer], no ded, $40 copay, auth thru [employer]here  Physician Information:  Nehemiah Norman MD    Plan of care signed (Y/N): []  Yes [x]  No     Date of Patient follow up with Physician:      Progress Report: []  Yes  [x]  No     Date Range for reporting period:  Beginnin2022  Ending:     Progress report due (10 Rx/or 30 days whichever is less): visit #10 or  (date)     Recertification due (POC duration/ or 90 days whichever is less): visit #12 or 10/22 (date)     Visit # Insurance Allowable Auth required?  Date Range   1 BMN [x]  Yes  []  No TBD         Latex Allergy:  [x]NO      []YES  Preferred Language for Healthcare:   [x]English       []other:    Functional Scale:           Date assessed:  TO physical FS primary measure score = 83; risk adjusted = 55      Pain level:  3-8/10     SUBJECTIVE:  See eval    OBJECTIVE: See eval  Observation:   Test measurements:      RESTRICTIONS/PRECAUTIONS: none      Treatment based classification:    [] mobilization/manipulation   [] stabilization   [] extension based   [x] flexion based   [] lateral shift   [] traction   [] unspecified Components:   [] thoracolumbar   [] pelvic   [] SIJ   [] sacral   [] hip         Comparable sign: LBP with ext and SB    Exercises/Interventions:     Therapeutic Exercise (38559) Resistance / level Sets / Seconds Reps Notes / Cues                 1/2 kneel QL stretch  Door QL stretch    Inc L lumbar pain   SL open book  1 109B Therapeutic Activities (64510)                                          Neuromuscular Re-ed (55066)                                          Manual Intervention ([id number])       SL QL STM  SL QL distraction stretch  Supine contract relax with hip hike/long axis traction   10 min                                                                Modalities:     Pt. Education:  -pt educated on diagnosis, prognosis and expectations for rehab  -all pt questions were answered    Home Exercise Prorgam:  Access Code: [email or web address]  URL: [email or web address]  Date: 08/30/2022  Prepared by: [facility]     Exercises  Sidelying Thoracic Rotation with Open Book - 1 x daily - 7 x weekly - 3 sets - 10 reps    Therapeutic Exercise and NMR EXR  [] (69474) Provided verbal/tactile cueing for activities related to strengthening, flexibility, endurance, ROM  for improvements in proximal hip and core control with self care, mobility, lifting and ambulation.  [] (55538) Provided verbal/tactile cueing for activities related to improving balance, coordination, kinesthetic sense, posture, motor skill, proprioception  to assist with core control in self care, mobility, lifting, and ambulation.   [] (65600) Therapist is in constant attendance of 2 or more patients providing skilled therapy interventions, but not providing any significant amount of measurable one-on-one time to either patient, for improvements in LE, proximal hip, and core control in self care, mobility, lifting, ambulation and eccentric single leg control.      Therapeutic Activities:    [] (60467 or 93098) Provided verbal/tactile cueing for activities related to improving balance, coordination, kinesthetic sense, posture, motor skill, proprioception and motor activation to allow for proper function  with self care and ADLs  [] (35739) Provided training and instruction to the patient for proper core and proximal hip recruitment and positioning with ambulation re-education     Home Exercise Program:    [x] (19593) Reviewed/Progressed HEP activities related to strengthening, flexibility, endurance, ROM of core, proximal hip and LE for functional self-care, mobility, lifting and ambulation   [] (04804) Reviewed/Progressed HEP activities related to improving balance, coordination, kinesthetic sense, posture, motor skill, proprioception of core, proximal hip and LE for self care, mobility, lifting, and ambulation      Manual Treatments:  PROM / STM / Oscillations-Mobs:  G-I, II, III, IV (PA's, Inf., Post.)  [] (94992) Provided manual therapy to mobilize proximal hip and LS spine soft tissue/joints for the purpose of modulating pain, promoting relaxation,  increasing ROM, reducing/eliminating soft tissue swelling/inflammation/restriction, improving soft tissue extensibility and allowing for proper ROM for normal function with self care, mobility, lifting and ambulation. Charges:  Timed Code Treatment Minutes: 20   Total Treatment Minutes: 37       [x] EVAL - LOW (83980)   [] EVAL - MOD (53053)  [] EVAL - HIGH (99613)  [] RE-EVAL (86722)  [] LS(65296) x       [] Ionto  [] NMR (94058) x       [] Vaso  [x] Manual (65483) x    1   [] Ultrasound  [] TA x        [] Mech Traction (20132)  [] Aquatic Therapy x     [] ES (un) (43301):   [] Home Management Training x  [] ES(attended) (79248)   [] Dry Needling 1-2 muscles (86718):  [] Dry Needling 3+ muscles (649185  [] Group:      [] Other:     Goals:   Patient stated goal: eliminate LBP  [] Progressing: [] Met: [] Not Met: [] Adjusted     Therapist goals for Patient:   Short Term Goals: To be achieved in: 2 weeks  1. Independent in HEP and progression per patient tolerance, in order to prevent re-injury. [] Progressing: [] Met: [] Not Met: [] Adjusted  2. Patient will have a decrease in pain to 0/10 with sitting to improve ability to rest without pain. [] Progressing: [] Met: [] Not Met: [] Adjusted     Long Term Goals:  To be achieved in: 6 weeks  1. FOTO score of at least 86 to assist with reaching prior level of function with housework and self care. [] Progressing: [] Met: [] Not Met: [] Adjusted  2. Patient will demonstrate increased lumbar rotation to 100% and pain free lumbar ext for improved functional mobility to don seat belt. [] Progressing: [] Met: [] Not Met: [] Adjusted  3. Patient will demonstrate an increase in lateral hip Strength to 5/5 to promote improve lumbar stability with gait (to avoid hip drop). [] Progressing: [] Met: [] Not Met: [] Adjusted  4. Patient will return to functional activities including performing core exercises such as crunches without increased symptoms or restriction. [] Progressing: [] Met: [] Not Met: [] Adjusted  5. Pt can walk up to 30 min without pain for community ambulation. [] Progressing: [] Met: [] Not Met: [] Adjusted             Overall Progression Towards Functional goals/ Treatment Progress Update:  [] Patient is progressing as expected towards functional goals listed. [] Progression is slowed due to complexities/Impairments listed. [] Progression has been slowed due to co-morbidities.   [x] Plan just implemented, too soon to assess goals progression <30days   [] Goals require adjustment due to lack of progress  [] Patient is not progressing as expected and requires additional follow up with physician  [] Other    Persisting Functional Limitations/Impairments:  []Sitting []Standing   []Walking []Squatting/bending    []Stairs []ADL's    []Transfers []Reaching  []Housework []Job related tasks  []Driving []Sports/Recreation   []Sleeping []Other:    ASSESSMENT:  See eval  Treatment/Activity Tolerance:  [x] Patient able to complete tx  [] Patient limited by fatique  [] Patient limited by pain  [] Patient limited by other medical complications  [] Other:     Prognosis: [] Good [] Fair  [] Poor    Patient Requires Follow-up: [x] Yes  [] No    PLAN: See eval. PT 2x / week for 6 weeks. For MRI auth; sent request for DN  approval via inbasket on 8/30  [] Continue per plan of care [] Alter current plan (see comments)  [x] Plan of care initiated [] Hold pending MD visit [] Discharge    Electronically signed by: John Storm PT, DPT ATC      Note: If patient does not return for scheduled/ recommended follow up visits, this note will serve as a discharge from care along with most recent update on progress.

## 2022-09-07 ENCOUNTER — APPOINTMENT (OUTPATIENT)
Dept: PHYSICAL THERAPY | Age: 72
End: 2022-09-07
Payer: MEDICARE

## 2022-09-12 ENCOUNTER — HOSPITAL ENCOUNTER (OUTPATIENT)
Dept: PHYSICAL THERAPY | Age: 72
Setting detail: THERAPIES SERIES
Discharge: HOME | End: 2022-09-12
Payer: MEDICARE

## 2022-09-12 PROCEDURE — 97110 THERAPEUTIC EXERCISES: CPT

## 2022-09-12 PROCEDURE — 97140 MANUAL THERAPY 1/> REGIONS: CPT

## 2022-09-12 PROCEDURE — 97112 NEUROMUSCULAR REEDUCATION: CPT

## 2022-09-12 NOTE — FLOWSHEET NOTE
1/2 kneel QL stretch  Door QL stretch    Inc L lumbar pain      Step stretch       Lateral walk green 3 30 ft    clam green  20                  Therapeutic Activities (23577)                                          Neuromuscular Re-ed (62214)       Unilateral farmer carry with  20# 2 30 ft    Alt LE in supine table top  1 20B    Bridge march 1 20B    plank  20\" 3    paloff 6 plates 1 06U    Manual Intervention (27708)       SL QL STM   8 min                                                                Modalities:     Pt. Education:  -pt educated on diagnosis, prognosis and expectations for rehab  -all pt questions were answered    Home Exercise Prorgam:  Access Code: [email or web address]  URL: [email or web address]  Date: 09/12/2022  Prepared by: Sandy Rizo    Exercises  Clamshell with Resistance - 1 x daily - 7 x weekly - 3 sets - 10 reps  Standard Plank - 1 x daily - 7 x weekly - 3 sets - 20-30 sec hold  Marching Bridge - 1 x daily - 7 x weekly - 3 sets - 10 reps  Supine 90/90 Alternating Toe Touch - 1 x daily - 7 x weekly - 3 sets - 10 reps  Standing Anti-Rotation Press with Anchored Resistance - 1 x daily - 7 x weekly - 3 sets - 10 reps  Farmer's Carry with Kettlebells - 1 x daily - 7 x weekly - 3 sets - 10 reps  Side Stepping with Resistance at Ankles - 1 x daily - 7 x weekly - 3 sets - 20 reps  Hip Flexor Stretch on Step - 1 x daily - 7 x weekly - 3 sets - 30 sec hold    Access Code: [email or web address]  URL: [email or web address]  Date: 08/30/2022  Prepared by: Sandy Rizo     Exercises  Sidelying Thoracic Rotation with Open Book - 1 x daily - 7 x weekly - 3 sets - 10 reps    Therapeutic Exercise and NMR EXR  [x] (31280) Provided verbal/tactile cueing for activities related to strengthening, flexibility, endurance, ROM  for improvements in proximal hip and core control with self care, mobility, lifting and ambulation.   [x] (30810) Provided verbal/tactile cueing for activities related to improving (92264)  [] EVAL - HIGH (17932)  [] RE-EVAL (45080)  [x] CG(21533) x    1   [] Ionto  [x] NMR (14129) x 1      [] Vaso  [x] Manual (81679) x    1   [] Ultrasound  [] TA x        [] Mech Traction (70653)  [] Aquatic Therapy x     [] ES (un) (70018):   [] Home Management Training x  [] ES(attended) (34955)   [] Dry Needling 1-2 muscles (23415):  [] Dry Needling 3+ muscles (021659  [] Group:      [] Other:     Goals:   Patient stated goal: eliminate LBP  [] Progressing: [] Met: [] Not Met: [] Adjusted     Therapist goals for Patient:   Short Term Goals: To be achieved in: 2 weeks  1. Independent in HEP and progression per patient tolerance, in order to prevent re-injury. [] Progressing: [] Met: [] Not Met: [] Adjusted  2. Patient will have a decrease in pain to 0/10 with sitting to improve ability to rest without pain. [] Progressing: [] Met: [] Not Met: [] Adjusted     Long Term Goals: To be achieved in: 6 weeks  1. FOTO score of at least 86 to assist with reaching prior level of function with housework and self care. [] Progressing: [] Met: [] Not Met: [] Adjusted  2. Patient will demonstrate increased lumbar rotation to 100% and pain free lumbar ext for improved functional mobility to don seat belt. [] Progressing: [] Met: [] Not Met: [] Adjusted  3. Patient will demonstrate an increase in lateral hip Strength to 5/5 to promote improve lumbar stability with gait (to avoid hip drop). [] Progressing: [] Met: [] Not Met: [] Adjusted  4. Patient will return to functional activities including performing core exercises such as crunches without increased symptoms or restriction. [] Progressing: [] Met: [] Not Met: [] Adjusted  5. Pt can walk up to 30 min without pain for community ambulation. [] Progressing: [] Met: [] Not Met: [] Adjusted             Overall Progression Towards Functional goals/ Treatment Progress Update:  [] Patient is progressing as expected towards functional goals listed.     [] Progression is slowed due to complexities/Impairments listed. [] Progression has been slowed due to co-morbidities. [x] Plan just implemented, too soon to assess goals progression <30days   [] Goals require adjustment due to lack of progress  [] Patient is not progressing as expected and requires additional follow up with physician  [] Other    Persisting Functional Limitations/Impairments:  []Sitting []Standing   []Walking []Squatting/bending    []Stairs []ADL's    []Transfers []Reaching  []Housework []Job related tasks  []Driving []Sports/Recreation   []Sleeping []Other:    ASSESSMENT:  Minimal to no TTP L QL psoas/illiacus. Pt tolerated hip stregnth and sore stability progressions and educated on HEP. Pt to continue with indep Hep and return PRN. Treatment/Activity Tolerance:  [x] Patient able to complete tx  [] Patient limited by fatique  [] Patient limited by pain  [] Patient limited by other medical complications  [] Other:     Prognosis: [] Good [] Fair  [] Poor    Patient Requires Follow-up: [x] Yes  [] No    PLAN: See eval. PT 2x / week for 6 weeks. For MRI auth; sent request for DN  approval via inbasket on 8/30  [x] Continue per plan of care [] Alter current plan (see comments)  [] Plan of care initiated [] Hold pending MD visit [] Discharge    Electronically signed by: Gely King, PT, DPT ATC      Note: If patient does not return for scheduled/ recommended follow up visits, this note will serve as a discharge from care along with most recent update on progress.

## 2022-09-14 ENCOUNTER — OFFICE VISIT (OUTPATIENT)
Dept: ENT CLINIC | Age: 72
End: 2022-09-14
Payer: MEDICARE

## 2022-09-14 VITALS — DIASTOLIC BLOOD PRESSURE: 70 MMHG | TEMPERATURE: 97.3 F | SYSTOLIC BLOOD PRESSURE: 122 MMHG | HEART RATE: 60 BPM

## 2022-09-14 DIAGNOSIS — J30.1 SEASONAL ALLERGIC RHINITIS DUE TO POLLEN: Primary | Chronic | ICD-10-CM

## 2022-09-14 DIAGNOSIS — J32.9 CHRONIC SINUSITIS, UNSPECIFIED LOCATION: Chronic | ICD-10-CM

## 2022-09-14 PROCEDURE — 1123F ACP DISCUSS/DSCN MKR DOCD: CPT | Performed by: OTOLARYNGOLOGY

## 2022-09-14 PROCEDURE — G8420 CALC BMI NORM PARAMETERS: HCPCS | Performed by: OTOLARYNGOLOGY

## 2022-09-14 PROCEDURE — 1036F TOBACCO NON-USER: CPT | Performed by: OTOLARYNGOLOGY

## 2022-09-14 PROCEDURE — G8427 DOCREV CUR MEDS BY ELIG CLIN: HCPCS | Performed by: OTOLARYNGOLOGY

## 2022-09-14 PROCEDURE — 99213 OFFICE O/P EST LOW 20 MIN: CPT | Performed by: OTOLARYNGOLOGY

## 2022-09-14 PROCEDURE — 3017F COLORECTAL CA SCREEN DOC REV: CPT | Performed by: OTOLARYNGOLOGY

## 2022-09-14 RX ORDER — FEXOFENADINE HCL 180 MG/1
180 TABLET ORAL DAILY
Qty: 30 TABLET | Refills: 1 | Status: SHIPPED | OUTPATIENT
Start: 2022-09-14

## 2022-09-14 RX ORDER — FLUTICASONE PROPIONATE 50 MCG
SPRAY, SUSPENSION (ML) NASAL
Qty: 16 G | Refills: 2 | Status: SHIPPED | OUTPATIENT
Start: 2022-09-14

## 2022-09-14 NOTE — PROGRESS NOTES
[facility]       PCP:  Courtney Castro, APRN - [facility]  Chief Complaint   Patient presents with    Nasal Congestion     Excessive phlegm in nose and throat. Other     Throat gets clogged and painful. HISTORY OF PRESENT ILLNESS     Nena Toney is a 80 y.o. male who presented today for evaluation and management for sinusitis. \"Throat clogged up and nose clogged up with mucus. I can cough it up and spit it out or my nose gets filled up with phlegm but usually clears well when I clear both sides out. But it comes back an hour or two later. No pain in face or sinuses. Sometimes voice is hoarse, comes and goes. Usually lasts until I clear mucus. When I get excited, especially when watching sports, my throat starts killing me, hurts real bad and I had to go away from the TV and calm down. And it will clear in 10 -15 minutes. Then I don't go back to the watching TV. Does not happen when I'm working out. Started happening about 4-5 months ago. \"  Cardiologist recommended taking a nitroglycerin tab and he did that when it was already coming down from it. I had an angiogram 6 weeks ago and arteries were all clear. I can watch other sports and nothing happens it only when I watch sport and I get excited about it. \"       PAST MEDICAL HISTORY    Past Medical History:   Diagnosis Date    CAD (coronary artery disease)     Cervical spondylosis 01/21/2020    Chronic renal insufficiency, stage 2 (mild) 11/17/2016    Head pain cephalgia 01/21/2014    Hyperlipidemia     Hypertension     Perennial allergic rhinitis 02/11/2015    Pre-diabetes        Past Surgical History:   Procedure Laterality Date    CARDIAC SURGERY      Stents    CHOLECYSTECTOMY      COLONOSCOPY      CORONARY ANGIOPLASTY WITH STENT PLACEMENT  2011    KNEE ARTHROSCOPY      LEFT    ROTATOR CUFF REPAIR      right, and left    TONSILLECTOMY UPPER GASTROINTESTINAL ENDOSCOPY N/A 8/3/2022    ENDOSCOP[facility] UL[facility]ASOUND [facility]:    09/14/22 0942   BP: 122/70   Pulse: 60   Temp: 97.3 °F (36.3 °C)   TempSrc: Temporal     General:  WDWN, NAD, alert and oriented  Face: There was no swelling or lesions detected. Voice: Normal with no hoarseness or hot potato voice. Ears: The external ears, mastoids, TMs and EACs appeared to be normal.   Nose: The external nose, nasal septum, turbinates, secretions, and mucosa appeared to be normal.   Sinuses:  Maxillary and frontal sinuses were nontender to palpation and percussion. Oral cavity:  Mucosa, secretions, tongue, and gingiva appeared to be normal.   Oropharynx:  Post tonsillectomy. The hard and soft palates, uvula, tongue, posterior oropharyngeal wall, mucosa and secretions appeared to be normal.     INDIRECT LARYNGOSCOPY:   The vocal cords were atrophic with bowing on phonation consistent with dysphonia of aging. Vocal cords appeared to be normal with no leukoplakia, mass, polyp, nodule or ulceration and appeared to be normally mobile bilaterally with midline approximation on phonation. Otherwise, the epiglottis, supraglottis, false vocal cords, true vocal cords, base of tongue, subglottis, and piriform sinuses appeared to be normal.    Salivary Glands:  Normal bilateral parotid and bilateral submandibular salivary glands. Neck:  No masses or tenderness. Trachea midline. Laryngeal cartilages and hyoid bone normal.  Normal laryngeal crepitus. Thyroid:  Normal, nontender, no goiter or nodules palpable. Lymph nodes:  No cervical lymphadenopathy. Modesto Carvalho / Kandice Davenport / Hafsa Madison was seen today for nasal congestion and other. Diagnoses and all orders for this visit:    Seasonal allergic rhinitis due to pollen  -     fexofenadine (ALLEGRA) 180 MG tablet;  Take 1 tablet by mouth daily  - fluticasone (FLONASE) 50 MCG/ACT nasal spray; 2 sprays in each nostril daily. Chronic sinusitis, unspecified location         RECOMMENDATIONS/PLAN      Resume Flonase. Over the counter allergy medications. Acetaminophen (eg. Tylenol) or Ibuprofen (eg. Advil) (over the counter medications) as needed for fever or pain. Mucinex-D, Sudafed, or other OTC medications for nasal and/or sinus congestion. Return in about 6 months (around 3/14/2023) for recheck/follow-up, and sooner if condition worsens.

## 2022-12-17 ENCOUNTER — APPOINTMENT (OUTPATIENT)
Dept: GENERAL RADIOLOGY | Age: 72
End: 2022-12-17
Payer: MEDICARE

## 2022-12-17 ENCOUNTER — HOSPITAL ENCOUNTER (EMERGENCY)
Age: 72
Discharge: HOME | End: 2022-12-17
Attending: STUDENT IN AN ORGANIZED HEALTH CARE EDUCATION/TRAINING PROGRAM
Payer: MEDICARE

## 2022-12-17 VITALS
WEIGHT: 201 LBS | DIASTOLIC BLOOD PRESSURE: 72 MMHG | BODY MASS INDEX: 25.12 KG/M2 | RESPIRATION RATE: 17 BRPM | OXYGEN SATURATION: 98 % | TEMPERATURE: 98.4 F | SYSTOLIC BLOOD PRESSURE: 120 MMHG | HEART RATE: 52 BPM

## 2022-12-17 DIAGNOSIS — R09.1 PLEURISY: Primary | ICD-10-CM

## 2022-12-17 LAB
A/G RATIO: 0.9 (ref 1.1–2.2)
ALBUMIN SERPL-MCNC: 3.4 G/DL (ref 3.4–5)
ALP BLD-CCNC: 95 U/L (ref 40–129)
ALT SERPL-CCNC: 39 U/L (ref 10–40)
ANION GAP SERPL CALCULATED.3IONS-SCNC: 9 MMOL/L (ref 3–16)
AST SERPL-CCNC: 34 U/L (ref 15–37)
BASOPHILS ABSOLUTE: 0.2 K/UL (ref 0–0.2)
BASOPHILS RELATIVE PERCENT: 3.1 %
BILIRUB SERPL-MCNC: 0.3 MG/DL (ref 0–1)
BUN BLDV-MCNC: 24 MG/DL (ref 7–20)
CALCIUM SERPL-MCNC: 9 MG/DL (ref 8.3–10.6)
CHLORIDE BLD-SCNC: 101 MMOL/L (ref 99–110)
CO2: 26 MMOL/L (ref 21–32)
CREAT SERPL-MCNC: 1.5 MG/DL (ref 0.8–1.3)
EKG ATRIAL RATE: 62 BPM
EKG DIAGNOSIS: NORMAL
EKG P AXIS: 72 DEGREES
EKG P-R INTERVAL: 156 MS
EKG Q-T INTERVAL: 406 MS
EKG QRS DURATION: 92 MS
EKG QTC CALCULATION (BAZETT): 412 MS
EKG R AXIS: 76 DEGREES
EKG T AXIS: 64 DEGREES
EKG VENTRICULAR RATE: 62 BPM
EOSINOPHILS ABSOLUTE: 0.1 K/UL (ref 0–0.6)
EOSINOPHILS RELATIVE PERCENT: 1.7 %
GFR SERPL CREATININE-BSD FRML MDRD: 49 ML/MIN/{1.73_M2}
GLUCOSE BLD-MCNC: 97 MG/DL (ref 70–99)
HCT VFR BLD CALC: 46 % (ref 40.5–52.5)
HEMOGLOBIN: 15.7 G/DL (ref 13.5–17.5)
LYMPHOCYTES ABSOLUTE: 1.6 K/UL (ref 1–5.1)
LYMPHOCYTES RELATIVE PERCENT: 24.7 %
MCH RBC QN AUTO: 31.4 PG (ref 26–34)
MCHC RBC AUTO-ENTMCNC: 34.2 G/DL (ref 31–36)
MCV RBC AUTO: 91.7 FL (ref 80–100)
MONOCYTES ABSOLUTE: 0.8 K/UL (ref 0–1.3)
MONOCYTES RELATIVE PERCENT: 12.4 %
NEUTROPHILS ABSOLUTE: 3.7 K/UL (ref 1.7–7.7)
NEUTROPHILS RELATIVE PERCENT: 58.1 %
PDW BLD-RTO: 13.5 % (ref 12.4–15.4)
PLATELET # BLD: 127 K/UL (ref 135–450)
PMV BLD AUTO: 9 FL (ref 5–10.5)
POTASSIUM REFLEX MAGNESIUM: 4.4 MMOL/L (ref 3.5–5.1)
PRO-BNP: 8 PG/ML (ref 0–124)
RAPID INFLUENZA  B AGN: NEGATIVE
RAPID INFLUENZA A AGN: NEGATIVE
RBC # BLD: 5.02 M/UL (ref 4.2–5.9)
SARS-COV-2, NAAT: NOT DETECTED
SODIUM BLD-SCNC: 136 MMOL/L (ref 136–145)
TOTAL PROTEIN: 7 G/DL (ref 6.4–8.2)
TROPONIN: <0.01 NG/ML
WBC # BLD: 6.5 K/UL (ref 4–11)

## 2022-12-17 PROCEDURE — 93005 ELECTROCARDIOGRAM TRACING: CPT | Performed by: STUDENT IN AN ORGANIZED HEALTH CARE EDUCATION/TRAINING PROGRAM

## 2022-12-17 PROCEDURE — 83880 ASSAY OF NATRIURETIC PEPTIDE: CPT

## 2022-12-17 PROCEDURE — 99285 EMERGENCY DEPT VISIT HI MDM: CPT

## 2022-12-17 PROCEDURE — 84484 ASSAY OF TROPONIN QUANT: CPT

## 2022-12-17 PROCEDURE — 87635 SARS-COV-2 COVID-19 AMP PRB: CPT

## 2022-12-17 PROCEDURE — 6370000000 HC RX 637 (ALT 250 FOR IP): Performed by: STUDENT IN AN ORGANIZED HEALTH CARE EDUCATION/TRAINING PROGRAM

## 2022-12-17 PROCEDURE — 87804 INFLUENZA ASSAY W/OPTIC: CPT

## 2022-12-17 PROCEDURE — 36415 COLL VENOUS BLD VENIPUNCTURE: CPT

## 2022-12-17 PROCEDURE — 93010 ELECTROCARDIOGRAM REPORT: CPT | Performed by: INTERNAL MEDICINE

## 2022-12-17 PROCEDURE — 71046 X-RAY EXAM CHEST 2 VIEWS: CPT

## 2022-12-17 PROCEDURE — 80053 COMPREHEN METABOLIC PANEL: CPT

## 2022-12-17 PROCEDURE — 85025 COMPLETE CBC W/AUTO DIFF WBC: CPT

## 2022-12-17 RX ORDER — ACETAMINOPHEN 325 MG/1
650 TABLET ORAL ONCE
Status: COMPLETED | OUTPATIENT
Start: 2022-12-17 | End: 2022-12-17

## 2022-12-17 RX ORDER — PREDNISONE 50 MG/1
50 TABLET ORAL DAILY
Qty: 5 TABLET | Refills: 0 | Status: SHIPPED | OUTPATIENT
Start: 2022-12-17 | End: 2022-12-22

## 2022-12-17 RX ORDER — PREDNISONE 20 MG/1
20 TABLET ORAL ONCE
Status: COMPLETED | OUTPATIENT
Start: 2022-12-17 | End: 2022-12-17

## 2022-12-17 RX ORDER — ACETAMINOPHEN 325 MG/1
650 TABLET ORAL EVERY 6 HOURS PRN
Qty: 120 TABLET | Refills: 3 | Status: SHIPPED | OUTPATIENT
Start: 2022-12-17

## 2022-12-17 RX ADMIN — PREDNISONE 20 MG: 20 TABLET ORAL at 02:13

## 2022-12-17 RX ADMIN — ACETAMINOPHEN 650 MG: 325 TABLET ORAL at 02:12

## 2022-12-17 ASSESSMENT — LIFESTYLE VARIABLES
HOW MANY STANDARD DRINKS CONTAINING ALCOHOL DO YOU HAVE ON A TYPICAL DAY: PATIENT DOES NOT DRINK
HOW OFTEN DO YOU HAVE A DRINK CONTAINING ALCOHOL: NEVER

## 2022-12-17 ASSESSMENT — ENCOUNTER SYMPTOMS
BACK PAIN: 0
SORE THROAT: 0
SHORTNESS OF BREATH: 0
CONSTIPATION: 0
ABDOMINAL PAIN: 0
CHEST TIGHTNESS: 0
BLOOD IN STOOL: 0

## 2022-12-17 NOTE — ED PROVIDER NOTES
Chest pain [facility]      Pt Name: Reyna Bee  MRN: [id number]  [place] 1950  Date of evaluation: 12/17/2022  Provider: Harry Palafox MD    CHIEF COMPLAINT       Chief Complaint   Patient presents with    Chest Pain     Pt arrived in the ED via walk in   Pt complains of chest pain that is sharp and 7/10 when he breathes in that started today  Pt states he is not Sob   Pt has stents placed          HISTORY OF PRESENT ILLNESS   (Location/Symptom, Timing/Onset, Context/Setting, Quality, Duration, Modifying Factors, Severity)  Note limiting factors. Reyna Bee is a 71 y.o. male who presents to the emergency department with pain to the left side of the chest present with deep inspiration only. Pain started at 9 PM while he was at rest watching TV. He does not have chest pain unless he is taking a deep breath in. This has happened before but resolved after several minutes. Today it has gone down in intensity since onset. No radiation of pain. Denies recent fever or cough. Pt denies leg pain/redness/swelling, hormone therapy, recent surgery/travel/hospital stay, prior DVT/PE, hemoptysis, or hx of cancer or chemo. Hx of cardiac stent placed in 2011. He states that this pain does not feel anything like when he needed his stent. Takes baby aspirin daily. Nursing Notes were reviewed. REVIEW OF SYSTEMS    (2-9 systems for level 4, 10 or more for level 5)     Review of Systems   Constitutional:  Negative for chills and fatigue. HENT:  Negative for congestion and sore throat. Respiratory:  Negative for chest tightness and shortness of breath. Cardiovascular:  Positive for chest pain. Negative for leg swelling. Gastrointestinal:  Negative for abdominal pain, blood in stool and constipation. Genitourinary:  Negative for dysuria and frequency. Musculoskeletal:  Negative for arthralgias and back pain.    Skin: Negative for rash and wound. Neurological:  Negative for dizziness, syncope and headaches. Psychiatric/Behavioral:  Negative for confusion and decreased concentration. Except as noted above the remainder of the review of systems was reviewed and negative. PAST MEDICAL HISTORY     Past Medical History:   Diagnosis Date    CAD (coronary artery disease)     Cervical spondylosis 01/21/2020    Chronic renal insufficiency, stage 2 (mild) 11/17/2016    Head pain cephalgia 01/21/2014    Hyperlipidemia     Hypertension     Perennial allergic rhinitis 02/11/2015    Pre-diabetes          SURGICAL HISTORY       Past Surgical History:   Procedure Laterality Date    CARDIAC SURGERY      Stents    CHOLECYSTECTOMY      COLONOSCOPY      CORONARY ANGIOPLASTY WITH STENT PLACEMENT  2011    KNEE ARTHROSCOPY      LEFT    ROTATOR CUFF REPAIR      right, and left    TONSILLECTOMY      UPPER GASTROINTESTINAL ENDOSCOPY N/A 8/3/2022    ENDOSCOPIC ULTRASOUND EXAM performed by Romero Banks MD at [address] [facility]       Discharge Medication List as of 12/17/2022  4:15 AM        CONTINUE these medications which have NOT CHANGED    Details   fexofenadine (ALLEGRA) 180 MG tablet Take 1 tablet by mouth daily, Disp-30 tablet, R-1Normal      fluticasone (FLONASE) 50 MCG/ACT nasal spray 2 sprays in each nostril daily. , Disp-16 g, R-2Normal      rosuvastatin (CRESTOR) 40 MG tablet Take 40 mg by mouth every eveningHistorical Med      losartan (COZAAR) 50 MG tablet Take 50 mg by mouth in the morning. Historical Med      hydrochlorothiazide (HYDRODIURIL) 25 MG tablet Take 25 mg by mouth in the morning. Historical Med      aspirin 81 MG EC tablet Take 81 mg by mouth daily.   Historical Med      Psyllium (METAMUCIL PO) Take by mouth daily 1 TABLESPOON DAILYHistorical Med             ALLERGIES     Ibuprofen    FAMILY HISTORY       Family History   Problem Relation Age of Onset    High Blood Pressure Mother Emphysema Mother     High Blood Pressure Father     Cancer Father     No Known Problems Brother     No Known Problems Brother           SOCIAL HISTORY       Social History     Socioeconomic History    Marital status:      Spouse name: None    Number of children: None    Years of education: None    Highest education level: None   Tobacco Use    Smoking status: Never    Smokeless tobacco: Never   Vaping Use    Vaping Use: Never used   Substance and Sexual Activity    Alcohol use: No    Drug use: Never       SCREENINGS        [place] Coma Scale  Eye Opening: Spontaneous  Best Verbal Response: Oriented  Best Motor Response: Obeys commands  [place] Coma Scale Score: 15               PHYSICAL EXAM    (up to 7 for level 4, 8 or more for level 5)     ED Triage Vitals [12/17/22 0020]   BP Temp Temp Source Heart Rate Resp SpO2 Height Weight   (!) 178/77 98.4 °F (36.9 °C) Oral 73 18 98 % -- 201 lb (91.2 kg)       Physical Exam  Constitutional:       Appearance: Normal appearance. HENT:      Head: Normocephalic and atraumatic. Eyes:      General:         Right eye: No discharge. Left eye: No discharge. Conjunctiva/sclera: Conjunctivae normal.   Cardiovascular:      Rate and Rhythm: Normal rate and regular rhythm. Heart sounds: Normal heart sounds. No murmur heard. Comments: Symmetric, 2+ radial pulses  Pulmonary:      Effort: Pulmonary effort is normal. No respiratory distress. Breath sounds: Normal breath sounds. Comments: Pain recreated with deep inspiration. No chest wall tenderness. Abdominal:      General: Abdomen is flat. Bowel sounds are normal.      Palpations: Abdomen is soft. Tenderness: There is no abdominal tenderness. Musculoskeletal:         General: No swelling or tenderness. Right lower leg: No edema. Left lower leg: No edema. Skin:     General: Skin is warm and dry. Capillary Refill: Capillary refill takes less than 2 seconds.    Neurological: Mental Status: He is alert and oriented to person, place, and time. Psychiatric:         Mood and Affect: Mood normal.         Behavior: Behavior normal.       DIAGNOSTIC RESULTS     EKG: All EKG's are interpreted by the Emergency Department Physician who either signs or Co-signs this chart in the absence of a cardiologist.    The Ekg interpreted by me in the absence of a cardiologist shows. Normal sinus rhythm with a ventricular rate of 62. Axis normal.  QTc appropriate. No specific ST or T wave abnormality. No S1Q3T3. No significant change from prior 6/2/2013    RADIOLOGY:   Non-plain film images such as CT, Ultrasound and MRI are read by the radiologist. Plain radiographic images are visualized and preliminarily interpreted by the emergency physician with the below findings:        Interpretation per the Radiologist below, if available at the time of this note:    XR CHEST (2 VW)   Preliminary Result   No acute cardiopulmonary process demonstrated. No evidence of infiltrate or   atelectasis in the lungs. No pleural effusion or pneumothorax. Stable normal cardiac size without evidence of cardiac decompensation. No obvious fracture in the visualized bony thorax on these two views. LABS:  Labs Reviewed   CBC WITH AUTO DIFFERENTIAL - Abnormal; Notable for the following components:       Result Value    Platelets 331 (*)     All other components within normal limits   COMPREHENSIVE METABOLIC PANEL W/ REFLEX TO MG FOR LOW K - Abnormal; Notable for the following components:    BUN 24 (*)     Creatinine 1.5 (*)     Est, Glom Filt Rate 49 (*)     Albumin/Globulin Ratio 0.9 (*)     All other components within normal limits   COVID-19, RAPID   RAPID INFLUENZA A/B ANTIGENS   TROPONIN   BRAIN NATRIURETIC PEPTIDE       All other labs were within normal range or not returned as of this dictation.     EMERGENCY DEPARTMENT COURSE and DIFFERENTIAL DIAGNOSIS/MDM:   Vitals:    Vitals:    12/17/22 0145 12/17/22 0221 12/17/22 0230 12/17/22 0245   BP: 127/71 121/72 127/70 120/72   Pulse: 54 50 (!) 49 52   Resp: 14 12 15 17   Temp:       TempSrc:       SpO2: 95% 99% 96% 98%   Weight:         Vitals:    12/17/22 0245   BP: 120/72   Pulse: 52   Resp: 17   Temp:    SpO2: 98%         Is this patient to be included in the SEP-1 Core Measure due to severe sepsis or septic shock? No   Exclusion criteria - the patient is NOT to be included for SEP-1 Core Measure due to: Infection is not suspected    Medications   acetaminophen (TYLENOL) tablet 650 mg (650 mg Oral Given 12/17/22 0212)   predniSONE (DELTASONE) tablet 20 mg (20 mg Oral Given 12/17/22 0213)         Course and MDM:  Patient presents for evaluation of chest pain only with deep inspiration. Patient arrives comfortable appearing and hemodynamically stable. Greater than 6 hours of symptoms, EKG and troponin are not suggestive of any ischemic change. He has no clinically significant electrolyte derangement or RAJINDER today. No anemia to explain symptoms. Chest x-ray without signs of infiltrate or effusion or enlarged cardiac silhouetter. Flu negative. Pain very consistent with pleurisy. His EKG and exam is not consistent with significant pericardial effusion. I also have very low suspicion for DVT with normal BNP and troponin and lack of heart strain pattern on EKG. HEART Score less than 3 conferring less than 2% risk major cardiac event at 30 days which was discussed with patient. Pain completely improved with steroids and Tylenol here. Will avoid NSAIDs due to CKD stage III. Patient given strict precautions return for new or worsening chest pain or trouble breathing. Otherwise appropriate for PCP follow-up in 1 week. He is agreeable to plan. The patient has no social barriers affecting health care that are apparent. PROCEDURES:  Unless otherwise noted below, none     Procedures        FINAL IMPRESSION      1.  Pleurisy DISPOSITION/PLAN   DISPOSITION Decision To Discharge 12/17/2022 04:00:52 AM      PATIENT REFERRED TO:  Saintclair Pluck, KAREN - CNP  [address] [id number] [phone]    In 1 week      DISCHARGE MEDICATIONS:      Discharge Medication List as of 12/17/2022  4:15 AM        START taking these medications    Details   predniSONE (DELTASONE) 50 MG tablet Take 1 tablet by mouth daily for 5 days, Disp-5 tablet, R-0Normal      acetaminophen (AMINOFEN) 325 MG tablet Take 2 tablets by mouth every 6 hours as needed for Pain, Disp-120 tablet, R-3Normal             Controlled Substances Monitoring:    If the patient was prescribed a controlled substance today, the PDMP was reviewed as documented below. No flowsheet data found.     (Please note that portions of this note were completed with a voice recognition program.  Efforts were made to edit the dictations but occasionally words are mis-transcribed.)    Tommie Foster MD (electronically signed)  Attending Emergency Physician           Cheryl Greco MD  12/17/22 [id number]

## 2022-12-29 ENCOUNTER — OFFICE VISIT (OUTPATIENT)
Dept: ENT CLINIC | Age: 72
End: 2022-12-29
Payer: MEDICARE

## 2022-12-29 VITALS
BODY MASS INDEX: 25.36 KG/M2 | SYSTOLIC BLOOD PRESSURE: 130 MMHG | DIASTOLIC BLOOD PRESSURE: 73 MMHG | WEIGHT: 204 LBS | HEIGHT: 75 IN | HEART RATE: 74 BPM | TEMPERATURE: 97.3 F

## 2022-12-29 DIAGNOSIS — J31.0 CHRONIC RHINITIS: Primary | Chronic | ICD-10-CM

## 2022-12-29 DIAGNOSIS — R09.89 PHLEGM IN THROAT: ICD-10-CM

## 2022-12-29 DIAGNOSIS — J34.89 RHINORRHEA: ICD-10-CM

## 2022-12-29 PROCEDURE — 3078F DIAST BP <80 MM HG: CPT | Performed by: OTOLARYNGOLOGY

## 2022-12-29 PROCEDURE — G8427 DOCREV CUR MEDS BY ELIG CLIN: HCPCS | Performed by: OTOLARYNGOLOGY

## 2022-12-29 PROCEDURE — 1036F TOBACCO NON-USER: CPT | Performed by: OTOLARYNGOLOGY

## 2022-12-29 PROCEDURE — 3074F SYST BP LT 130 MM HG: CPT | Performed by: OTOLARYNGOLOGY

## 2022-12-29 PROCEDURE — G8484 FLU IMMUNIZE NO ADMIN: HCPCS | Performed by: OTOLARYNGOLOGY

## 2022-12-29 PROCEDURE — G8417 CALC BMI ABV UP PARAM F/U: HCPCS | Performed by: OTOLARYNGOLOGY

## 2022-12-29 PROCEDURE — 99213 OFFICE O/P EST LOW 20 MIN: CPT | Performed by: OTOLARYNGOLOGY

## 2022-12-29 PROCEDURE — 3017F COLORECTAL CA SCREEN DOC REV: CPT | Performed by: OTOLARYNGOLOGY

## 2022-12-29 PROCEDURE — 1123F ACP DISCUSS/DSCN MKR DOCD: CPT | Performed by: OTOLARYNGOLOGY

## 2022-12-29 RX ORDER — IPRATROPIUM BROMIDE 42 UG/1
2 SPRAY, METERED NASAL 4 TIMES DAILY
Qty: 30 ML | Refills: 2 | Status: SHIPPED | OUTPATIENT
Start: 2022-12-29

## 2022-12-29 ASSESSMENT — ENCOUNTER SYMPTOMS
SORE THROAT: 0
RHINORRHEA: 0
SINUS PAIN: 0

## 2022-12-29 NOTE — PROGRESS NOTES
[facility]            PCP:  Mansi Barth, [address] [place] [address]  Chief Complaint   Patient presents with    Nasal Congestion     Excessive nasal phlegm/mucus       HISTORY OF PRESENT ILLNESS    Nohemi Pina is a 71 y.o. male who presented today for evaluation and management for sinusitis. Sinus congestion, \"I blow a lot of mucus out of my nose and some goes down my throat and I try to cough it out and that when the throat gets irritated. Seems to not go away, mucus is there all the time in my throat. I'm having pain in the throat when I try to cough the mucus up and sometimes hurts even when not excited, same type of hurt as when we discoverred the heart problem. Goes away in 5 minutes. Previous throat was hurting only when I got excited about a boxing match or sport event. \"  No facial or sinus pain. 2-3 times a day, this happens, build up mucus in nose and try to blow it out but some trickles down to back of throat and irritates the throat for a few mintues. REVIEW OF SYSTEMS   Review of Systems   Constitutional:  Negative for chills and fever. HENT:  Negative for ear discharge, ear pain, hearing loss, rhinorrhea, sinus pain, sore throat and tinnitus.           PAST MEDICAL HISTORY    Past Medical History:   Diagnosis Date    CAD (coronary artery disease)     Cervical spondylosis 01/21/2020    Chronic renal insufficiency, stage 2 (mild) 11/17/2016    Head pain cephalgia 01/21/2014    Hyperlipidemia     Hypertension     Perennial allergic rhinitis 02/11/2015    Pre-diabetes        Past Surgical History:   Procedure Laterality Date    CARDIAC SURGERY      Stents    CHOLECYSTECTOMY      COLONOSCOPY      CORONARY ANGIOPLASTY WITH STENT PLACEMENT  2011    KNEE ARTHROSCOPY      LEFT    ROTATOR CUFF REPAIR      right, and left    TONSILLECTOMY      UPPER GASTROINTESTINAL ENDOSCOPY N/A 8/3/2022    ENDOSCOPIC ULTRASOUND EX[facility]:    12/29/22 1054   BP: 130/73   Pulse: 74   Temp: 97.3 °F (36.3 °C)   TempSrc: Temporal   Weight: 204 lb (92.5 kg)   Height: 6' 3\" (1.905 m)     General:  WDWN, NAD, alert and oriented  Face: There was no swelling or lesions detected. Voice:  Normal with no hoarseness or hot potato voice. Ears: The external ears, mastoids, TMs and EACs appeared to be normal.   Nose:  The external nose, nasal septum, turbinates, secretions, and mucosa appeared to be normal.   Sinuses:  Maxillary and frontal sinuses were nontender to palpation and percussion. Oral cavity:  Mucosa, secretions, tongue, and gingiva appeared to be normal.   Oropharynx:  Post tonsillectomy. The hard and soft palates, uvula, tongue, posterior oropharyngeal wall, mucosa and secretions appeared to be normal.     INDIRECT LARYNGOSCOPY:  Mild posterior laryngitis, c/w possible LPRD. Vocal cords appeared to be normal with no leukoplakia, mass, polyp, nodule or ulceration and appeared to be normally mobile bilaterally with midline approximation on phonation. Otherwise, the epiglottis, supraglottis, false vocal cords, true vocal cords, base of tongue, subglottis, and piriform sinuses appeared to be normal.   Neck:  No masses or tenderness. Trachea midline. Laryngeal cartilages and hyoid bone normal.  Normal laryngeal crepitus. Lymph nodes:  No cervical lymphadenopathy. Jorge Dos Santos / Severiano Thomas / Sabino Post was seen today for nasal congestion. Diagnoses and all orders for this visit:    Chronic rhinitis  -     ipratropium (ATROVENT) 0.06 % nasal spray; 2 sprays by Each Nostril route 4 times daily - as needed for nasal mucus. Rhinorrhea  -     ipratropium (ATROVENT) 0.06 % nasal spray; 2 sprays by Each Nostril route 4 times daily - as needed for nasal mucus.     Phlegm in throat         RECOMMENDATIONS/PLAN      Over the counter allergy medications, such as Allegra/fexofenadine, Claritin/loratadine or Zyrtec/cetirizine or Flonase/fluticasone. Trial of ipratropium. Consider PPI therapy for LPRD, if no response to ipratropium. Return in about 6 weeks (around 2/9/2023) for recheck/follow-up, and sooner if condition worsens.

## 2023-01-25 DIAGNOSIS — J34.89 RHINORRHEA: ICD-10-CM

## 2023-01-25 DIAGNOSIS — J31.0 CHRONIC RHINITIS: Chronic | ICD-10-CM

## 2023-01-25 NOTE — TELEPHONE ENCOUNTER
A 60-day supply was prescribed on 12/29/22. Therefore, patient does not need a refill until at least 02/29/2023. I did speak to the patient and he does have 2 refills left on his prescription.

## 2023-01-26 RX ORDER — IPRATROPIUM BROMIDE 42 UG/1
2 SPRAY, METERED NASAL 4 TIMES DAILY
Refills: 5 | OUTPATIENT
Start: 2023-01-26

## 2023-02-08 ENCOUNTER — OFFICE VISIT (OUTPATIENT)
Dept: ENT CLINIC | Age: 73
End: 2023-02-08
Payer: MEDICARE

## 2023-02-08 VITALS
HEART RATE: 86 BPM | WEIGHT: 202 LBS | DIASTOLIC BLOOD PRESSURE: 71 MMHG | TEMPERATURE: 96.8 F | BODY MASS INDEX: 25.12 KG/M2 | HEIGHT: 75 IN | SYSTOLIC BLOOD PRESSURE: 107 MMHG

## 2023-02-08 DIAGNOSIS — J01.90 ACUTE RHINOSINUSITIS: Primary | ICD-10-CM

## 2023-02-08 DIAGNOSIS — J30.1 SEASONAL ALLERGIC RHINITIS DUE TO POLLEN: ICD-10-CM

## 2023-02-08 DIAGNOSIS — J31.0 CHRONIC RHINITIS: ICD-10-CM

## 2023-02-08 DIAGNOSIS — J32.9 CHRONIC SINUSITIS, UNSPECIFIED LOCATION: ICD-10-CM

## 2023-02-08 DIAGNOSIS — R09.89 PHLEGM IN THROAT: ICD-10-CM

## 2023-02-08 PROCEDURE — G8427 DOCREV CUR MEDS BY ELIG CLIN: HCPCS | Performed by: OTOLARYNGOLOGY

## 2023-02-08 PROCEDURE — 3074F SYST BP LT 130 MM HG: CPT | Performed by: OTOLARYNGOLOGY

## 2023-02-08 PROCEDURE — G8417 CALC BMI ABV UP PARAM F/U: HCPCS | Performed by: OTOLARYNGOLOGY

## 2023-02-08 PROCEDURE — 1123F ACP DISCUSS/DSCN MKR DOCD: CPT | Performed by: OTOLARYNGOLOGY

## 2023-02-08 PROCEDURE — 3017F COLORECTAL CA SCREEN DOC REV: CPT | Performed by: OTOLARYNGOLOGY

## 2023-02-08 PROCEDURE — 3078F DIAST BP <80 MM HG: CPT | Performed by: OTOLARYNGOLOGY

## 2023-02-08 PROCEDURE — G8484 FLU IMMUNIZE NO ADMIN: HCPCS | Performed by: OTOLARYNGOLOGY

## 2023-02-08 PROCEDURE — 1036F TOBACCO NON-USER: CPT | Performed by: OTOLARYNGOLOGY

## 2023-02-08 PROCEDURE — 99214 OFFICE O/P EST MOD 30 MIN: CPT | Performed by: OTOLARYNGOLOGY

## 2023-02-08 RX ORDER — AZITHROMYCIN 250 MG/1
TABLET, FILM COATED ORAL
Qty: 2 PACKET | Refills: 0 | Status: SHIPPED | OUTPATIENT
Start: 2023-02-08

## 2023-02-08 ASSESSMENT — ENCOUNTER SYMPTOMS
SINUS PAIN: 0
RHINORRHEA: 0
SORE THROAT: 0

## 2023-02-08 NOTE — PROGRESS NOTES
[facility]           PCP:  KAREN Coffey - [facility]      CHIEF COMPLAINT  Chief Complaint   Patient presents with    Nose Problem        HISTORY OF PRESENT ILLNESS    Bony Javier is a 71 y.o. male here for recheck and follow up of chronic rhinitis, rhinorrhea, and excessive phlegm in throat. He was started on ipratropium nasal spray at the last visit to try to decrease the volume of mucus in the nose and hopefully phlegm in the throat. Patient stated that he was having nasal problems. \"My nose clogs up with mucus, and I try to blow it out and it just comes right back and eventually forms a scab that comes out eventually when I blow my nose. My throat feels clogged and I try to cough it up and it irritates my throat and is causing a lot of issues with anxiety. I started seeing a therapist because of things I'm going though and I can't get to sleep. I had a nose bleed once after I blew out one of these [mucus crust, patient showed me a photo of what appeared to be a mucus crust on his cell phone]. It only lasted for a few seconds. \"  He denied any dripping nosebleed. He stated that \"I feel like my nose is clogged up and I can't breath properly and when I can't breath properly, that caused a lot of anxiety. There always seems to be something caked in there [in his nose] so I can't breathe . And my throat gets caked up. REVIEW OF SYSTEMS   Review of Systems   Constitutional:  Negative for chills and fever. HENT:  Negative for ear discharge, ear pain, rhinorrhea, sinus pain and sore throat.           PAST MEDICAL HISTORY    Past Medical History:   Diagnosis Date    CAD (coronary artery disease)     Cervical spondylosis 01/21/2020    Chronic renal insufficiency, stage 2 (mild) 11/17/2016    Head pain cephalgia 01/21/2014    Hyperlipidemia     Hypertension     Perennial allergic rhinitis 02/11/2015    Pre-diabetes Past Surgical History:   Procedure Laterality Date    CARDIAC SURGERY      Stents    CHOLECYSTECTOMY      COLONOSCOPY      CORONARY ANGIOPLASTY WITH STENT PLACEMENT  2011    KNEE ARTHROSCOPY      LEFT    ROTATOR CUFF REPAIR      right, and left    TONSILLECTOMY      UPPER GASTROINTESTINAL ENDOSCOPY N/A 8/3/2022    ENDOSCOPIC ULTRASOUND EXAM performed [facility]:    02/08/23 1044   BP: 107/71   Pulse: 86   Temp: 96.8 °F (36 °C)   TempSrc: Temporal   Weight: 202 lb (91.6 kg)   Height: 6' 3\" (1.905 m)     General:  WDWN, NAD, alert and oriented  Face: There was no swelling or lesions detected. Voice: Normal with no hoarseness or hot potato voice. Ears: The external ears, mastoids, TMs and EACs appeared to be normal.  Nose: The external nose, nasal septum, turbinates, secretions, and mucosa appeared to be normal.   Sinuses:  Maxillary and frontal sinuses were nontender to palpation and percussion. Oral cavity:  Mucosa, secretions, tongue, and gingiva appeared to be normal.   Oropharynx:  Post tonsillectomy. The hard and soft palates, uvula, tongue, posterior oropharyngeal wall, mucosa and secretions appeared to be normal.     Salivary Glands:  Normal bilateral parotid and bilateral submandibular salivary glands. Neck:  No masses or tenderness. Trachea midline. Laryngeal cartilages and hyoid bone normal.  Normal laryngeal crepitus. Thyroid:  Normal, nontender, no goiter or nodules palpable. Lymph nodes:  No cervical lymphadenopathy. Nabila Humphrey / Tamia Pierson / Lazaro Francis was seen today for nose problem. Diagnoses and all orders for this visit:    Acute rhinosinusitis  -     azithromycin (ZITHROMAX Z-ENEDINA) 250 MG tablet; Take by mouth: 2 tablets on day 1, then 1 tab on day 2 to 5. Start 2nd enedina on day 10.   Take 2 tablets on day 10, then 1 tab on day 11 to 15    Chronic rhinitis    Phlegm in throat    Seasonal allergic rhinitis due to pollen    Chronic sinusitis, unspecified location         RECOMMENDATIONS/PLAN      Over the counter allergy medications, such as Allegra/fexofenadine, Claritin/loratadine or Zyrtec/cetirizine or Flonase/fluticasone. .    See Patient Instructions on file for this visit, which were discussed with the patient. Acetaminophen (e.g. Tylenol) (over the counter medications) as needed for fever or pain. Return in about 3 weeks (around 3/1/2023) for recheck/follow-up, and sooner if condition worsens.

## 2023-02-08 NOTE — PATIENT INSTRUCTIONS
RHINOSINUSITIS CARE  You may use acetaminophen (eg. Tylenol) or Ibuprofen (eg. Advil) (over the counter medications) as needed for fever or pain. Take Probiotic while you are taking antibiotics, to prevent diarrhea, stomach upset, pseudomembranous colitis, and C. difficile diarrhea. This may be obtained at your pharmacy or health food store. Alternatively, you may eat one cup of yogurt with active or live cultures twice daily while taking the antibiotic. Continue for two to three days after completion of the antibiotic. Use a 12 hour decongestant spray, 0.05% oxymetazoline (e.g. Afrin, Duration, 4-Way). Spray each nostril twice three times a day for three days, then two times a day for 2 days, and then stop for two days and then repeat the cycle once. Take one Mucinex (blue box) maximum strength 1200 mg tablet every 12 hours daily for the next ten days. SALINE NASAL MIST/SPRAY -  Use saline nasal mist every two hours while awake for one week, then every four hours while awake for one week, then four times a day and then as needed for dryness or crusting in the nose. A steam inhaler mask device may be helpful. These can be purchased at your pharmacy. Use a saline nasal/sinus irrigation system, e.g. NeilMed sinus rinse, twice daily to help to clear secretions and drainage. Use distilled water; DO NOT USE TAP WATER! This is because of the possibility of amoeba or other microorganisms in tap water, which can result in a fatal disease. Alternatively, you could use a blue bulb syringe and solution of 1/4 tsp of table salt in 8 ounces (one cup or 240 ml) of distilled water. Use fluticasone 2 sprays in each nostril once daily. It may take several days to several weeks for your sinusitis to clear up. It is important to take all your medications as prescribed. Please continue your antibiotics as prescribed.     Please call the office if your condition worsens or if symptoms persist after treatment is completed.        ==============================================================      ADVERSE AND SIDE EFFECTS OF MEDICATIONS:    Please be aware of the following possible adverse reactions, side effects, and complications of the following medications, including, but not limited to: allergic reaction, interactions with other medications, nausea, headache, diarrhea, persistent symptoms, failure to improve, and the following:     Z-tmio (antibiotic):  diarrhea, colitis (severe infection and inflammation of the large intestine), pseudomembranous colitis (severe infection and inflammation of the colon, usually due to C. difficile bacteria)  yeast or fungal  infections, Nieto-Bravo syndrome (very rare necrotic skin reaction with peeling of skin, blisters and arthritis), persistent symptoms/infection, and failure to improve. Fluticasone:  nosebleed, burning sensation, atrophy of nasal mucosa, septal ulceration or perforation, nasal irritation, nasal yeast infection,  drowsiness, bad taste. Taking Probiotic while you are taking antibiotics, may help to prevent diarrhea, stomach upset, pseudomembranous colitis, and C. difficile diarrhea. This may be obtained at your pharmacy or health food store. Alternatively, you may eat one cup of yogurt with active or live cultures twice daily while taking the antibiotic. Continue for two to three days after completion of the antibiotic.    ~~~>>> Also please read the medication information in this AVS and all information given to you by the pharmacist.           Patient Education        azithromycin (oral/injection)  Pronunciation:  a MARTÍNEZ HARRIS sin  Brand:  Azithromycin 3 Day Dose Pack, Azithromycin 5 Day Dose Pack, Zithromax, Zithromax IV, Zithromax TRI-TIMO, Zithromax Z-Timo  What is the most important information I should know about azithromycin?   You should not use azithromycin if you have ever had an allergic reaction, jaundice, or liver problems while taking this medicine. You should not use azithromycin if you have ever had a severe allergic reaction to similar drugs such as clarithromycin, erythromycin, or telithromycin. What is azithromycin? Azithromycin is used to treat many different types of infections caused by bacteria, including infections of the lungs, sinus, throat, tonsils, skin, urinary tract, cervix, or genitals. Azithromycin may also be used for purposes not listed in this medication guide. What should I discuss with my healthcare provider before using azithromycin? You should not use azithromycin if you are allergic to it, or if you have ever had:  jaundice or liver problems caused by taking azithromycin; or  a severe allergic reaction to similar drugs such as clarithromycin, erythromycin, or telithromycin. Azithromycin oral should not be used to treat pneumonia in people who have:  cystic fibrosis;  an infection after being in a hospital;  an infection in the blood;  a weak immune system (caused by diseases such as HIV/AIDS or cancer); or  in older adults and those who are ill or debilitated. Tell your doctor if you have ever had:  pneumonia;  liver or kidney disease;  myasthenia gravis;  low levels of potassium in your blood;  a heart rhythm disorder; or  long QT syndrome (in you or a family member). It is not known  whether this medicine is effective in treating genital ulcers in women. Tell your doctor if you are pregnant or breastfeeding. Taking azithromycin while breastfeeding may cause diarrhea, vomiting, or rash in the nursing baby. Azithromycin is not approved for use by anyone younger than 7 months old. Azithromycin should not be used to treat a throat or tonsil infection in a child younger than 3years old. How should I take azithromycin? Follow all directions on your prescription label and read all medication guides or instruction sheets. Use the medicine exactly as directed. Azithromycin oral  is taken by mouth.  Azithromycin injection is given as an infusion into a vein, usually for 2 days before you switch to azithromycin oral. A healthcare provider will give you this injection. You may take azithromycin oral with or without food. Shake the oral suspension (liquid) before you measure a dose. Use the dosing syringe provided, or use a medicine dose-measuring device (not a kitchen spoon). Use this medicine for the full prescribed length of time, even if your symptoms quickly improve. Skipping doses can increase your risk of infection that is resistant to medication. Azithromycin will not treat a viral infection such as the flu or a common cold. Store at room temperature away from moisture and heat. Throw away any unused liquid medicine after 10 days. What happens if I miss a dose? Take the medicine as soon as you can, but skip the missed dose if it is almost time for your next dose. Do not take two doses at one time. What happens if I overdose? Seek emergency medical attention or call the Poison Help line at 1-448.559.5374. What should I avoid while taking azithromycin? Antibiotic medicines can cause diarrhea, which may be a sign of a new infection. If you have diarrhea that is watery or bloody, call your doctor before using anti-diarrhea medicine. Azithromycin could make you sunburn more easily. Avoid sunlight or tanning beds. Wear protective clothing and use sunscreen (SPF 30 or higher) when you are outdoors. What are the possible side effects of azithromycin? Get emergency medical help if you have signs of an allergic reaction (hives, difficult breathing, swelling in your face or throat) or a severe skin reaction (fever, sore throat, burning in your eyes, skin pain, red or purple skin rash that spreads and causes blistering and peeling). Seek medical treatment if you have a serious drug reaction that can affect many parts of your body.  Symptoms may include: skin rash, fever, swollen glands, muscle aches, severe weakness, unusual bruising, or yellowing of your skin or eyes. Call your doctor at once if you have:  severe stomach pain, diarrhea that is watery or bloody;  fast or pounding heartbeats, fluttering in your chest, shortness of breath, and sudden dizziness (like you might pass out); or  liver problems --nausea, vomiting, loss of appetite, stomach pain (upper right side), tiredness, itching, dark urine, mickey-colored stools, jaundice (yellowing of the skin or eyes); Call your doctor right away if a baby taking azithromycin becomes irritable or vomits while eating or nursing. Older adults may be more likely to have side effects on heart rhythm, including a life-threatening fast heart rate. Common side effects may include:  nausea, vomiting; or  stomach pain. This is not a complete list of side effects and others may occur. Call your doctor for medical advice about side effects. You may report side effects to FDA at 9-350-FDA-2437. What other drugs will affect azithromycin? Tell your doctor about all your other medicines, especially:  colchicine;  digoxin;  nelfinavir;  phenytoin;  an antacid that contains aluminum or magnesium --Acid Gone, Gaviscon, Gelusil, Maalox, Milk of Magnesia, Mylanta, Pepcid Complete, Rolaids, Rulox, and others; or  a blood thinner --warfarin, Coumadin, Jantoven. This list is not complete. Other drugs may affect azithromycin, including prescription and over-the-counter medicines, vitamins, and herbal products. Not all possible drug interactions are listed here. Where can I get more information? Your pharmacist can provide more information about azithromycin. Remember, keep this and all other medicines out of the reach of children, never share your medicines with others, and use this medication only for the indication prescribed.    Every effort has been made to ensure that the information provided by [address] is accurate, up-to-date, and complete, but no guarantee is made to that effect. Drug information contained herein may be time sensitive. [facility] information has been compiled for use by healthcare practitioners and consumers in the [place] and therefore [facility] does not warrant that uses outside of the [place] are appropriate, unless specifically indicated otherwise. [facility] drug information does not endorse drugs, diagnose patients or recommend therapy. [facility] drug information is an informational resource designed to assist licensed healthcare practitioners in caring for their patients and/or to serve consumers viewing this service as a supplement to, and not a substitute for, the expertise, skill, knowledge and judgment of healthcare practitioners. The absence of a warning for a given drug or drug combination in no way should be construed to indicate that the drug or drug combination is safe, effective or appropriate for any given patient. [facility] does not assume any responsibility for any aspect of healthcare administered with the aid of information [facility] provides. The information contained herein is not intended to cover all possible uses, directions, precautions, warnings, drug interactions, allergic reactions, or adverse effects. If you have questions about the drugs you are taking, check with your doctor, nurse or pharmacist.  Copyright 2278-6017 [address]: 18.01. Revision date: 5/2/2019. Care instructions adapted under license by [facility] [address] If you have questions about a medical condition or this instruction, always ask your healthcare professional. [place] [address] any warranty or liability for your use of this information.

## 2023-03-02 ENCOUNTER — OFFICE VISIT (OUTPATIENT)
Dept: ENT CLINIC | Age: 73
End: 2023-03-02
Payer: MEDICARE

## 2023-03-02 VITALS
OXYGEN SATURATION: 100 % | HEART RATE: 75 BPM | BODY MASS INDEX: 25.12 KG/M2 | SYSTOLIC BLOOD PRESSURE: 130 MMHG | WEIGHT: 202 LBS | HEIGHT: 75 IN | DIASTOLIC BLOOD PRESSURE: 82 MMHG

## 2023-03-02 DIAGNOSIS — R09.89 PHLEGM IN THROAT: Primary | ICD-10-CM

## 2023-03-02 PROCEDURE — 3074F SYST BP LT 130 MM HG: CPT | Performed by: OTOLARYNGOLOGY

## 2023-03-02 PROCEDURE — G8428 CUR MEDS NOT DOCUMENT: HCPCS | Performed by: OTOLARYNGOLOGY

## 2023-03-02 PROCEDURE — 99213 OFFICE O/P EST LOW 20 MIN: CPT | Performed by: OTOLARYNGOLOGY

## 2023-03-02 PROCEDURE — 1123F ACP DISCUSS/DSCN MKR DOCD: CPT | Performed by: OTOLARYNGOLOGY

## 2023-03-02 PROCEDURE — 3017F COLORECTAL CA SCREEN DOC REV: CPT | Performed by: OTOLARYNGOLOGY

## 2023-03-02 PROCEDURE — 1036F TOBACCO NON-USER: CPT | Performed by: OTOLARYNGOLOGY

## 2023-03-02 PROCEDURE — G8484 FLU IMMUNIZE NO ADMIN: HCPCS | Performed by: OTOLARYNGOLOGY

## 2023-03-02 PROCEDURE — 3078F DIAST BP <80 MM HG: CPT | Performed by: OTOLARYNGOLOGY

## 2023-03-02 PROCEDURE — G8417 CALC BMI ABV UP PARAM F/U: HCPCS | Performed by: OTOLARYNGOLOGY

## 2023-03-02 NOTE — PROGRESS NOTES
[facility]             PCP:  Saamntha Castillo, KAREN [facility] [address]  Chief Complaint   Patient presents with    Other     Phlegm in throat. HISTORY OF PRESENT ILLNESS    Perfecto Lewis is a 71 y.o. male here for recheck and follow up of phlegm in the throat. Patient stated that he still has phlegm in his throat. Throat feels the same as at the last visit. Sores in the nostril are gone and were gone at last visit. Not blowing out the scabs. Still PND into throat and try to cough it up and irritates throat. Doing Flonase and nasal sinus rinse. Steam inhaler OTC      PAST MEDICAL HISTORY    Past Medical History:   Diagnosis Date    CAD (coronary artery disease)     Cervical spondylosis 01/21/2020    Chronic renal insufficiency, stage 2 (mild) 11/17/2016    Head pain cephalgia 01/21/2014    Hyperlipidemia     Hypertension     Perennial allergic rhinitis 02/11/2015    Pre-diabetes          Past Surgical History:   Procedure Laterality Date    CARDIAC SURGERY      Stents    CHOLECYSTECTOMY      COLONOSCOPY      CORONARY ANGIOPLASTY WITH STENT PLACEMENT  2011    KNEE ARTHROSCOPY      LEFT    ROTATOR CUFF REPAIR      right, and left    TONSILLECTOMY      UPPER GASTROINTESTINAL ENDOSCOPY N/A 8/3/2022    ENDOSCOPIC UL[facility]AS[facility]    03/02/23 1020   BP: 130/82   Pulse: 75   SpO2: 100%   Weight: 202 lb (91.6 kg)   Height: 6' 3\" (1.905 m)     General:  WDWN, NAD, alert and oriented  Face: There was no swelling or lesions detected. Voice: Normal with no hoarseness or hot potato voice. Ears:   The external ears, mastoids, TMs and EACs appeared to be normal.   Nose: The external nose, nasal septum, turbinates, secretions, and mucosa appeared to be normal.   Sinuses:  Maxillary and frontal sinuses were nontender to palpation and

## 2023-04-03 ENCOUNTER — APPOINTMENT (OUTPATIENT)
Dept: GENERAL RADIOLOGY | Age: 73
End: 2023-04-03
Payer: MEDICARE

## 2023-04-03 ENCOUNTER — HOSPITAL ENCOUNTER (EMERGENCY)
Age: 73
Discharge: HOME | End: 2023-04-03
Attending: INTERNAL MEDICINE
Payer: MEDICARE

## 2023-04-03 VITALS
BODY MASS INDEX: 24.99 KG/M2 | RESPIRATION RATE: 19 BRPM | HEART RATE: 56 BPM | OXYGEN SATURATION: 96 % | HEIGHT: 75 IN | SYSTOLIC BLOOD PRESSURE: 128 MMHG | TEMPERATURE: 98 F | DIASTOLIC BLOOD PRESSURE: 72 MMHG | WEIGHT: 201 LBS

## 2023-04-03 DIAGNOSIS — R42 DIZZINESS: Primary | ICD-10-CM

## 2023-04-03 DIAGNOSIS — N39.0 ACUTE UTI: ICD-10-CM

## 2023-04-03 DIAGNOSIS — N28.9 ACUTE RENAL INSUFFICIENCY: ICD-10-CM

## 2023-04-03 LAB
ANION GAP SERPL CALCULATED.3IONS-SCNC: 9 MMOL/L (ref 3–16)
BACTERIA URNS QL MICRO: ABNORMAL /HPF
BASOPHILS # BLD: 0 K/UL (ref 0–0.2)
BASOPHILS NFR BLD: 0.6 %
BILIRUB UR QL STRIP.AUTO: NEGATIVE
BUN SERPL-MCNC: 19 MG/DL (ref 7–20)
CALCIUM SERPL-MCNC: 9.1 MG/DL (ref 8.3–10.6)
CHLORIDE SERPL-SCNC: 101 MMOL/L (ref 99–110)
CLARITY UR: ABNORMAL
CO2 SERPL-SCNC: 29 MMOL/L (ref 21–32)
COLOR UR: YELLOW
CREAT SERPL-MCNC: 1.6 MG/DL (ref 0.8–1.3)
DEPRECATED RDW RBC AUTO: 14 % (ref 12.4–15.4)
EOSINOPHIL # BLD: 0 K/UL (ref 0–0.6)
EOSINOPHIL NFR BLD: 0.6 %
EPI CELLS #/AREA URNS AUTO: 2 /HPF (ref 0–5)
GFR SERPLBLD CREATININE-BSD FMLA CKD-EPI: 46 ML/MIN/{1.73_M2}
GLUCOSE SERPL-MCNC: 109 MG/DL (ref 70–99)
GLUCOSE UR STRIP.AUTO-MCNC: NEGATIVE MG/DL
HCT VFR BLD AUTO: 49.6 % (ref 40.5–52.5)
HGB BLD-MCNC: 16.5 G/DL (ref 13.5–17.5)
HGB UR QL STRIP.AUTO: ABNORMAL
HYALINE CASTS #/AREA URNS AUTO: 0 /LPF (ref 0–8)
KETONES UR STRIP.AUTO-MCNC: ABNORMAL MG/DL
LEUKOCYTE ESTERASE UR QL STRIP.AUTO: ABNORMAL
LYMPHOCYTES # BLD: 2.3 K/UL (ref 1–5.1)
LYMPHOCYTES NFR BLD: 28.7 %
MCH RBC QN AUTO: 31 PG (ref 26–34)
MCHC RBC AUTO-ENTMCNC: 33.2 G/DL (ref 31–36)
MCV RBC AUTO: 93.3 FL (ref 80–100)
MONOCYTES # BLD: 0.9 K/UL (ref 0–1.3)
MONOCYTES NFR BLD: 11 %
NEUTROPHILS # BLD: 4.7 K/UL (ref 1.7–7.7)
NEUTROPHILS NFR BLD: 59.1 %
NITRITE UR QL STRIP.AUTO: NEGATIVE
PH UR STRIP.AUTO: 5.5 [PH] (ref 5–8)
PLATELET # BLD AUTO: 126 K/UL (ref 135–450)
PMV BLD AUTO: 8.6 FL (ref 5–10.5)
POTASSIUM SERPL-SCNC: 3.7 MMOL/L (ref 3.5–5.1)
PROT UR STRIP.AUTO-MCNC: NEGATIVE MG/DL
RBC # BLD AUTO: 5.32 M/UL (ref 4.2–5.9)
RBC CLUMPS #/AREA URNS AUTO: 3 /HPF (ref 0–4)
SODIUM SERPL-SCNC: 139 MMOL/L (ref 136–145)
SP GR UR STRIP.AUTO: 1.02 (ref 1–1.03)
TROPONIN T SERPL-MCNC: <0.01 NG/ML
UA COMPLETE W REFLEX CULTURE PNL UR: YES
UA DIPSTICK W REFLEX MICRO PNL UR: YES
URN SPEC COLLECT METH UR: ABNORMAL
UROBILINOGEN UR STRIP-ACNC: 1 E.U./DL
WBC # BLD AUTO: 8 K/UL (ref 4–11)
WBC #/AREA URNS AUTO: 17 /HPF (ref 0–5)

## 2023-04-03 PROCEDURE — 6370000000 HC RX 637 (ALT 250 FOR IP): Performed by: INTERNAL MEDICINE

## 2023-04-03 PROCEDURE — 93005 ELECTROCARDIOGRAM TRACING: CPT | Performed by: INTERNAL MEDICINE

## 2023-04-03 PROCEDURE — 71045 X-RAY EXAM CHEST 1 VIEW: CPT

## 2023-04-03 PROCEDURE — 84484 ASSAY OF TROPONIN QUANT: CPT

## 2023-04-03 PROCEDURE — 85025 COMPLETE CBC W/AUTO DIFF WBC: CPT

## 2023-04-03 PROCEDURE — 87186 SC STD MICRODIL/AGAR DIL: CPT

## 2023-04-03 PROCEDURE — 99285 EMERGENCY DEPT VISIT HI MDM: CPT

## 2023-04-03 PROCEDURE — 87077 CULTURE AEROBIC IDENTIFY: CPT

## 2023-04-03 PROCEDURE — 81001 URINALYSIS AUTO W/SCOPE: CPT

## 2023-04-03 PROCEDURE — 2580000003 HC RX 258: Performed by: INTERNAL MEDICINE

## 2023-04-03 PROCEDURE — 36415 COLL VENOUS BLD VENIPUNCTURE: CPT

## 2023-04-03 PROCEDURE — 96360 HYDRATION IV INFUSION INIT: CPT

## 2023-04-03 PROCEDURE — 80048 BASIC METABOLIC PNL TOTAL CA: CPT

## 2023-04-03 PROCEDURE — 87086 URINE CULTURE/COLONY COUNT: CPT

## 2023-04-03 RX ORDER — CEFUROXIME AXETIL 250 MG/1
250 TABLET ORAL 2 TIMES DAILY
Qty: 14 TABLET | Refills: 0 | Status: SHIPPED | OUTPATIENT
Start: 2023-04-03 | End: 2023-04-10

## 2023-04-03 RX ORDER — 0.9 % SODIUM CHLORIDE 0.9 %
500 INTRAVENOUS SOLUTION INTRAVENOUS ONCE
Status: COMPLETED | OUTPATIENT
Start: 2023-04-03 | End: 2023-04-03

## 2023-04-03 RX ORDER — CEFUROXIME AXETIL 250 MG/1
250 TABLET ORAL EVERY 12 HOURS SCHEDULED
Status: DISCONTINUED | OUTPATIENT
Start: 2023-04-03 | End: 2023-04-03

## 2023-04-03 RX ORDER — CEFUROXIME AXETIL 250 MG/1
250 TABLET ORAL ONCE
Status: COMPLETED | OUTPATIENT
Start: 2023-04-03 | End: 2023-04-03

## 2023-04-03 RX ADMIN — CEFUROXIME AXETIL 250 MG: 250 TABLET ORAL at 21:46

## 2023-04-03 RX ADMIN — SODIUM CHLORIDE 500 ML: 9 INJECTION, SOLUTION INTRAVENOUS at 21:48

## 2023-04-03 ASSESSMENT — PAIN SCALES - GENERAL
PAINLEVEL_OUTOF10: 0
PAINLEVEL_OUTOF10: 0

## 2023-04-03 ASSESSMENT — PAIN - FUNCTIONAL ASSESSMENT
PAIN_FUNCTIONAL_ASSESSMENT: 0-10
PAIN_FUNCTIONAL_ASSESSMENT: NONE - DENIES PAIN

## 2023-04-04 LAB
EKG ATRIAL RATE: 63 BPM
EKG DIAGNOSIS: NORMAL
EKG P AXIS: 48 DEGREES
EKG P-R INTERVAL: 154 MS
EKG Q-T INTERVAL: 404 MS
EKG QRS DURATION: 98 MS
EKG QTC CALCULATION (BAZETT): 413 MS
EKG R AXIS: 65 DEGREES
EKG T AXIS: 28 DEGREES
EKG VENTRICULAR RATE: 63 BPM

## 2023-04-04 PROCEDURE — 93010 ELECTROCARDIOGRAM REPORT: CPT | Performed by: INTERNAL MEDICINE

## 2023-04-04 NOTE — ED NOTES
Pt ambulated to the restroom without difficulty. No complaints or needs at this time.      Lukasz Clark RN  04/03/23 2030

## 2023-04-04 NOTE — ED PROVIDER NOTES
Addendum: This is a 81-year-old female who was seen by Dr. Lonza Brunner who turned the patient over to me prior to discharge. Patient presented with some acute lightheadedness. The patient's work-up today was extensive including a chest x-ray that shows no acute findings, troponin was negative and EKG that was unremarkable. The patient feels fine. He is he is able to stand without difficulty and is asymptomatic. His work-up did show possible urinary tract infection with some mild dehydration. The patient feels much better and is ready to go home. He was discharged with appropriate follow-up and instructions to return if worse. FINAL IMPRESSION      1. Dizziness    2. Acute UTI    3.  Acute renal insufficiency                  Amrita Yanez MD  04/03/23 6609
Neck: Supple. Trachea midline. Cardiovascular: RRR; heart has regular rhythm and rate. Pulmonary/Chest: Effort normal. No respiratory distress. Lungs are clear bilaterally. Abdominal: Soft. No distension. Abdomen is soft and nondistended. Neurological: Alert and oriented. Face symmetric. Speech is clear. Strength is equal bilaterally in the upper and lower extremities. Good finger-to-nose bilaterally. Sensation is intact bilaterally. Skin: Warm and dry. No rash. Procedures      EKG  The Ekg interpreted by me in the absence of a cardiologist shows. normal sinus rhythm with a rate of 63  Axis is   Normal  QTc is  normal  Intervals and Durations are unremarkable. No specific ST-T wave changes appreciated. No evidence of acute ischemia.      Radiology  XR CHEST PORTABLE   Final Result      Lungs are clear             Labs  Results for orders placed or performed during the hospital encounter of 04/03/23   CBC with Auto Differential   Result Value Ref Range    WBC 8.0 4.0 - 11.0 K/uL    RBC 5.32 4.20 - 5.90 M/uL    Hemoglobin 16.5 13.5 - 17.5 g/dL    Hematocrit 49.6 40.5 - 52.5 %    MCV 93.3 80.0 - 100.0 fL    MCH 31.0 26.0 - 34.0 pg    MCHC 33.2 31.0 - 36.0 g/dL    RDW 14.0 12.4 - 15.4 %    Platelets 978 (L) 842 - 450 K/uL    MPV 8.6 5.0 - 10.5 fL    Neutrophils % 59.1 %    Lymphocytes % 28.7 %    Monocytes % 11.0 %    Eosinophils % 0.6 %    Basophils % 0.6 %    Neutrophils Absolute 4.7 1.7 - 7.7 K/uL    Lymphocytes Absolute 2.3 1.0 - 5.1 K/uL    Monocytes Absolute 0.9 0.0 - 1.3 K/uL    Eosinophils Absolute 0.0 0.0 - 0.6 K/uL    Basophils Absolute 0.0 0.0 - 0.2 K/uL   Basic Metabolic Panel   Result Value Ref Range    Sodium 139 136 - 145 mmol/L    Potassium 3.7 3.5 - 5.1 mmol/L    Chloride 101 99 - 110 mmol/L    CO2 29 21 - 32 mmol/L    Anion Gap 9 3 - 16    Glucose 109 (H) 70 - 99 mg/dL    BUN 19 7 - 20 mg/dL    Creatinine 1.6 (H) 0.8 - 1.3 mg/dL    Est, Glom Filt Rate 46 (A) >60    Calcium 9.1 8.3

## 2023-04-05 LAB
BACTERIA UR CULT: ABNORMAL
ORGANISM: ABNORMAL

## 2023-04-25 DIAGNOSIS — J34.89 RHINORRHEA: ICD-10-CM

## 2023-04-25 DIAGNOSIS — J31.0 CHRONIC RHINITIS: Chronic | ICD-10-CM

## 2023-04-25 RX ORDER — IPRATROPIUM BROMIDE 42 UG/1
2 SPRAY, METERED NASAL 4 TIMES DAILY
Refills: 1 | OUTPATIENT
Start: 2023-04-25

## 2023-05-05 ENCOUNTER — TELEPHONE (OUTPATIENT)
Dept: ENT CLINIC | Age: 73
End: 2023-05-05

## 2023-05-11 ENCOUNTER — OFFICE VISIT (OUTPATIENT)
Dept: ENT CLINIC | Age: 73
End: 2023-05-11
Payer: MEDICARE

## 2023-05-11 VITALS
HEART RATE: 61 BPM | WEIGHT: 202 LBS | BODY MASS INDEX: 25.12 KG/M2 | OXYGEN SATURATION: 97 % | TEMPERATURE: 97.7 F | SYSTOLIC BLOOD PRESSURE: 130 MMHG | HEIGHT: 75 IN | DIASTOLIC BLOOD PRESSURE: 70 MMHG

## 2023-05-11 DIAGNOSIS — J30.9 ALLERGIC RHINITIS, UNSPECIFIED SEASONALITY, UNSPECIFIED TRIGGER: ICD-10-CM

## 2023-05-11 DIAGNOSIS — K21.9 LPRD (LARYNGOPHARYNGEAL REFLUX DISEASE): Primary | ICD-10-CM

## 2023-05-11 PROCEDURE — 3017F COLORECTAL CA SCREEN DOC REV: CPT | Performed by: OTOLARYNGOLOGY

## 2023-05-11 PROCEDURE — 3078F DIAST BP <80 MM HG: CPT | Performed by: OTOLARYNGOLOGY

## 2023-05-11 PROCEDURE — 31575 DIAGNOSTIC LARYNGOSCOPY: CPT | Performed by: OTOLARYNGOLOGY

## 2023-05-11 PROCEDURE — G8417 CALC BMI ABV UP PARAM F/U: HCPCS | Performed by: OTOLARYNGOLOGY

## 2023-05-11 PROCEDURE — 1036F TOBACCO NON-USER: CPT | Performed by: OTOLARYNGOLOGY

## 2023-05-11 PROCEDURE — 3075F SYST BP GE 130 - 139MM HG: CPT | Performed by: OTOLARYNGOLOGY

## 2023-05-11 PROCEDURE — 99213 OFFICE O/P EST LOW 20 MIN: CPT | Performed by: OTOLARYNGOLOGY

## 2023-05-11 PROCEDURE — 1123F ACP DISCUSS/DSCN MKR DOCD: CPT | Performed by: OTOLARYNGOLOGY

## 2023-05-11 PROCEDURE — G8427 DOCREV CUR MEDS BY ELIG CLIN: HCPCS | Performed by: OTOLARYNGOLOGY

## 2023-05-11 RX ORDER — OMEPRAZOLE 20 MG/1
CAPSULE, DELAYED RELEASE ORAL
Qty: 180 CAPSULE | Refills: 0 | Status: SHIPPED | OUTPATIENT
Start: 2023-05-11

## 2023-05-11 ASSESSMENT — ENCOUNTER SYMPTOMS
SINUS PAIN: 0
RHINORRHEA: 0
SORE THROAT: 0

## 2023-05-11 NOTE — PROGRESS NOTES
Temporal   SpO2: 97%   Weight: 202 lb (91.6 kg)   Height: 6' 3\" (1.905 m)     General:  WDWN, NAD, alert and oriented  Face: There was no swelling or lesions detected. Voice:  Normal with no hoarseness or hot potato voice. Ears: The external ears, mastoids, TMs and EACs appeared to be normal.   Nose: turbinates enlarged bilaterally. The external nose, nasal septum, , secretions, and mucosa appeared to be normal.   Oral cavity:  Mucosa, secretions, tongue, and gingiva appeared to be normal.   Oropharynx:  Post tonsillectomy. Otherwise, the hard and soft palates, uvula, tongue, posterior oropharyngeal wall, mucosa and secretions appeared to be normal.     INDIRECT LARYNGOSCOPY:  consistent with LPRD. Vocal cords appeared to be normal with no leukoplakia, mass, polyp, nodule or ulceration and appeared to be normally mobile bilaterally with midline approximation on phonation. Otherwise, the epiglottis, supraglottis, false vocal cords, true vocal cords, base of tongue, subglottis, and piriform sinuses appeared to be normal.   Salivary Glands:  Normal bilateral parotid and bilateral submandibular salivary glands. Neck:  No masses or tenderness. Trachea midline. Laryngeal cartilages and hyoid bone normal.  Normal laryngeal crepitus. Thyroid:  Normal, nontender, no goiter or nodules palpable. Lymph nodes:  No cervical lymphadenopathy. Kris Crigler / Candance Goad / Janie Tera was seen today for pharyngitis. Diagnoses and all orders for this visit:    LPRD (laryngopharyngeal reflux disease)  -     omeprazole (PRILOSEC) 20 MG delayed release capsule; Take 1 capsule by mouth 2 times daily; take on an empty stomach and eat a meal or snack 45 to 60 minutes hour after each dose. Allergic rhinitis, unspecified seasonality, unspecified trigger  -     Allergen, Respiratory, Region 5 Panel; Future  -     Allergen, Food, Comprehensive Profile 1;  Future           RECOMMENDATIONS/PLAN

## 2023-05-11 NOTE — PATIENT INSTRUCTIONS
Laryngopharyngeal Reflux    Laryngopharyngeal reflux (LPR) is a condition in which stomach fluid refluxes, or flows backwards, up into your throat. This affects the back area of your voice box. It happens hundreds of times a day and involves a very small amount of fluid each time. There is no sensation or awareness of this reflux, such as the heartburn, pain, or indigestion associated with a related condition, gastroesophageal reflux, or GERD. These two conditions are distinctly different, although they may coexist in a given person. Generally, you will not be aware when LPR is happening. However, the stomach fluid contains an enzyme, called pepsin, which causes inflammation in your throat, which in turn, causes your symptoms. The most common symptoms associated with LPR reflux are a sensation of a lump in the throat or mucus that you cannot clear or something stuck in the throat, chronic cough, chronic hoarseness, sorethroat, and postnasal drainage. The most common sign of LPR is frequent throat clearing. A medication, called a proton pump inhibitor (PPI) is usually effective treatment for this condition. This medication decreases the amount of acid your stomach lining makes and puts into the stomach fluid. This raises the pH of the stomach fluid. Under this condition the pepsin is less active when reflux does occur and it, therefore, does not irritate your throat as much. Research with omeprazole, one type of PPI medication, showed that twice daily therapy may be needed to provide maximum and optimum effect to control or improve these symptoms. In the treatment of this condition, 40 mg once daily DOES NOT EQUAL 20 mg twice daily! In addition, the PPI medication must be taken on an empty stomach to maximize digestion and absorption of the medication. Your stomach is empty when you have had nothing to eat or drink for the preceding two hours.   Further, for maximum effectiveness, you must eat

## 2023-05-16 LAB
BARLEY IGE QN: <0.1
BEEF IGE QN: <0.1
BELL PEPPER IGE QN: <0.1
CABBAGE IGE QN: <0.1
CARROT IGE QN: <0.1
CHICKEN SERUM PROT IGE QN: <0.1
CODFISH IGE QN: <0.1
CORN IGE QN: <0.1
COW MILK IGE QN: <0.1
CRAB IGE QN: 0.4
EGG WHITE IGE QN: <0.1
GRAPE IGE QN: <0.1
IGE SERPL-ACNC: 246 [IU]/L
LETTUCE IGE QN: <0.1
OAT IGE QN: <0.1
ORANGE IGE QN: <0.1
PEANUT IGE QN: <0.1
PORK IGE QN: <0.1
POTATO IGE QN: <0.1
RICE IGE QN: <0.1
RYE IGE QN: <0.1
SHRIMP IGE QN: 0.76
SOYBEAN IGE QN: <0.1
TOMATO IGE QN: <0.1
TUNA IGE QN: <0.1
WHEAT IGE QN: <0.1
WHITE BEAN IGE QN: <0.1

## 2023-05-17 LAB
A ALTERNATA IGE QN: <0.1 KU/L (ref 0–0.34)
A FUMIGATUS IGE QN: <0.1 KU/L (ref 0–0.34)
AMER SYCAMORE IGE QN: <0.1 KU/L (ref 0–0.34)
BERMUDA GRASS IGE QN: <0.1 KU/L (ref 0–0.34)
BOXELDER IGE QN: <0.1 KU/L (ref 0–0.34)
C SPHAEROSPERMUM IGE QN: <0.1 KUL/L (ref 0–0.34)
CALIF WALNUT IGE QN: <0.1 KU/L (ref 0–0.34)
CAT DANDER IGE QN: <0.1 KU/L (ref 0–0.34)
CMN PIGWEED IGE QN: <0.1 KU/L (ref 0–0.34)
COMMON RAGWEED IGE QN: <0.1 KU/L (ref 0–0.34)
COTTONWOOD IGE QN: <0.1 KU/L (ref 0–0.34)
D FARINAE IGE QN: 0.71 KU/L (ref 0–0.34)
D PTERONYSS IGE QN: 0.74 KU/L (ref 0–0.34)
DOG DANDER IGE QN: <0.1 KU/L (ref 0–0.34)
IGE SERPL-ACNC: 168 IU/ML
M RACEMOSUS IGE QN: <0.1 KU/L (ref 0–0.34)
MOUSE EPITH IGE QN: <0.1 KU/L (ref 0–0.34)
P NOTATUM IGE QN: <0.1 KU/L (ref 0–0.34)
PECAN/HICK TREE IGE QN: <0.1 KU/L (ref 0–0.34)
RED CEDAR IGE QN: <0.1 KU/L (ref 0–0.34)
ROACH IGE QN: 0.37 KU/L (ref 0–0.34)
SALTWORT IGE QN: <0.1 KU/L (ref 0–0.34)
SHEEP SORREL IGE QN: <0.1 KU/L (ref 0–0.34)
SILVER BIRCH IGE QN: <0.1 KU/L (ref 0–0.34)
TIMOTHY IGE QN: <0.1 KU/L (ref 0–0.34)
WHITE ASH IGE QN: <0.1 KU/L (ref 0–0.34)
WHITE ELM IGE QN: <0.1 KU/L (ref 0–0.34)
WHITE MULBERRY IGE QN: <0.1 KU/L (ref 0–0.34)
WHITE OAK IGE QN: <0.1 KU/L (ref 0–0.34)

## 2023-08-01 DIAGNOSIS — K21.9 LPRD (LARYNGOPHARYNGEAL REFLUX DISEASE): ICD-10-CM

## 2023-08-01 RX ORDER — OMEPRAZOLE 20 MG/1
CAPSULE, DELAYED RELEASE ORAL
Qty: 180 CAPSULE | Refills: 0 | OUTPATIENT
Start: 2023-08-01

## 2023-08-31 DIAGNOSIS — J30.1 SEASONAL ALLERGIC RHINITIS DUE TO POLLEN: Chronic | ICD-10-CM

## 2023-08-31 RX ORDER — FLUTICASONE PROPIONATE 50 MCG
SPRAY, SUSPENSION (ML) NASAL
Qty: 48 G | OUTPATIENT
Start: 2023-08-31

## 2024-01-09 ENCOUNTER — APPOINTMENT (OUTPATIENT)
Dept: CT IMAGING | Age: 74
End: 2024-01-09
Payer: MEDICARE

## 2024-01-09 ENCOUNTER — HOSPITAL ENCOUNTER (EMERGENCY)
Age: 74
Discharge: HOME | End: 2024-01-09
Payer: MEDICARE

## 2024-01-09 VITALS
BODY MASS INDEX: 24.87 KG/M2 | SYSTOLIC BLOOD PRESSURE: 160 MMHG | TEMPERATURE: 99.1 F | OXYGEN SATURATION: 96 % | WEIGHT: 200 LBS | HEART RATE: 82 BPM | HEIGHT: 75 IN | RESPIRATION RATE: 18 BRPM | DIASTOLIC BLOOD PRESSURE: 64 MMHG

## 2024-01-09 DIAGNOSIS — S39.012A STRAIN OF LUMBAR REGION, INITIAL ENCOUNTER: Primary | ICD-10-CM

## 2024-01-09 PROCEDURE — 72131 CT LUMBAR SPINE W/O DYE: CPT

## 2024-01-09 PROCEDURE — 99284 EMERGENCY DEPT VISIT MOD MDM: CPT

## 2024-01-09 PROCEDURE — 6370000000 HC RX 637 (ALT 250 FOR IP): Performed by: PHYSICIAN ASSISTANT

## 2024-01-09 PROCEDURE — 96372 THER/PROPH/DIAG INJ SC/IM: CPT

## 2024-01-09 PROCEDURE — 6360000002 HC RX W HCPCS: Performed by: PHYSICIAN ASSISTANT

## 2024-01-09 RX ORDER — METHOCARBAMOL 750 MG/1
750 TABLET, FILM COATED ORAL ONCE
Status: COMPLETED | OUTPATIENT
Start: 2024-01-09 | End: 2024-01-09

## 2024-01-09 RX ORDER — KETOROLAC TROMETHAMINE 30 MG/ML
30 INJECTION, SOLUTION INTRAMUSCULAR; INTRAVENOUS ONCE
Status: COMPLETED | OUTPATIENT
Start: 2024-01-09 | End: 2024-01-09

## 2024-01-09 RX ORDER — LIDOCAINE 4 G/G
1 PATCH TOPICAL DAILY
Status: DISCONTINUED | OUTPATIENT
Start: 2024-01-09 | End: 2024-01-09 | Stop reason: HOSPADM

## 2024-01-09 RX ORDER — KETOROLAC TROMETHAMINE 10 MG/1
10 TABLET, FILM COATED ORAL EVERY 6 HOURS PRN
Qty: 20 TABLET | Refills: 0 | Status: SHIPPED | OUTPATIENT
Start: 2024-01-09

## 2024-01-09 RX ORDER — METHOCARBAMOL 750 MG/1
750 TABLET, FILM COATED ORAL 4 TIMES DAILY
Qty: 40 TABLET | Refills: 0 | Status: SHIPPED | OUTPATIENT
Start: 2024-01-09 | End: 2024-01-19

## 2024-01-09 RX ORDER — LIDOCAINE 50 MG/G
1 PATCH TOPICAL DAILY
Qty: 10 PATCH | Refills: 0 | Status: SHIPPED | OUTPATIENT
Start: 2024-01-09 | End: 2024-01-19

## 2024-01-09 RX ORDER — ACETAMINOPHEN 500 MG
1000 TABLET ORAL ONCE
Status: COMPLETED | OUTPATIENT
Start: 2024-01-09 | End: 2024-01-09

## 2024-01-09 RX ADMIN — KETOROLAC TROMETHAMINE 30 MG: 30 INJECTION, SOLUTION INTRAMUSCULAR at 10:23

## 2024-01-09 RX ADMIN — METHOCARBAMOL 750 MG: 750 TABLET ORAL at 10:24

## 2024-01-09 RX ADMIN — ACETAMINOPHEN 1000 MG: 500 TABLET ORAL at 10:24

## 2024-01-09 ASSESSMENT — ENCOUNTER SYMPTOMS
VOMITING: 0
NAUSEA: 0
BACK PAIN: 1
ABDOMINAL PAIN: 0
COUGH: 0
RHINORRHEA: 0
SORE THROAT: 0
SHORTNESS OF BREATH: 0
EYE PAIN: 0
DIARRHEA: 0
CONSTIPATION: 0

## 2024-01-09 ASSESSMENT — PAIN SCALES - GENERAL: PAINLEVEL_OUTOF10: 10

## 2024-01-09 NOTE — ED PROVIDER NOTES
Summary, DDx, ED Course, and Reassessment: 73-year-old male presents emergency room due to lower back pain that has been worsening over the last 5 days.  States that every time he moves he has sharp pains he also states that the lower back area spasms at times.  He denies any urinary or bowel dysfunction.  He has not taken any over-the-counter medications yet for his pain.    On exam he otherwise appears well resting on the exam bed.  Whenever he tries to move he does have a spasming type pain mainly focused in the lower back.  He does have some tenderness to palpation of the paraspinal muscles and midline tenderness.  He has a negative straight leg raise bilaterally.  He has gross sensation light touch to the bilateral lower extremities.  Distal pulses are intact.  There is no rashes or vesicular lesions.    Due to the amount of pain and midline tenderness patient has been seen over the last 5 days CT scan of the lumbar spine was ordered but did not show any acute abnormalities.    Patient was given pain medication and muscle relaxants here in the emergency department which did significantly improve his pain and at discharge is rating his pain a 5 out of 10.    Given his improvement in pain patient will be discharged with similar medication and to follow-up primary care doctor for recheck next week.    Low suspicion for fracture, spondylosis, transverse myelitis, epidural abscess, meningitis, reactive arthritis, AAA, aortic dissection, cauda equina syndrome, osteomyelitis, shingles, nephrolithiasis or ureterolithiasis or other emergent etiologies at this time.    Appropriate for outpatient management        I am the Primary Clinician of Record.    FINAL IMPRESSION      1. Strain of lumbar region, initial encounter          DISPOSITION/PLAN     DISPOSITION Decision To Discharge 01/09/2024 11:20:20 AM      PATIENT REFERRED TO:  Jhonny Aldana, APRN - CNP  [address]  [address]  [facility]

## 2024-01-10 NOTE — ED NOTES
1/10/24  Contacted pt re: ED visit 1/9/34; \"still having some pain\", encouraged to return if any problems/concerns.

## 2024-03-22 ENCOUNTER — HOSPITAL ENCOUNTER (OUTPATIENT)
Dept: CT IMAGING | Age: 74
Discharge: HOME | End: 2024-03-22
Payer: MEDICARE

## 2024-03-22 ENCOUNTER — HOSPITAL ENCOUNTER (OUTPATIENT)
Age: 74
Discharge: HOME | End: 2024-03-22
Payer: MEDICARE

## 2024-03-22 DIAGNOSIS — R10.32 ABDOMINAL PAIN, LEFT LOWER QUADRANT: ICD-10-CM

## 2024-03-22 LAB
CREAT SERPL-MCNC: 1.6 MG/DL (ref 0.8–1.3)
GFR SERPLBLD CREATININE-BSD FMLA CKD-EPI: 45 ML/MIN/{1.73_M2}

## 2024-03-22 PROCEDURE — 82565 ASSAY OF CREATININE: CPT

## 2024-03-22 PROCEDURE — 74177 CT ABD & PELVIS W/CONTRAST: CPT

## 2024-03-22 PROCEDURE — 6360000004 HC RX CONTRAST MEDICATION

## 2024-03-22 PROCEDURE — 36415 COLL VENOUS BLD VENIPUNCTURE: CPT

## 2024-03-22 RX ADMIN — IOPAMIDOL 75 ML: 755 INJECTION, SOLUTION INTRAVENOUS at 08:27

## 2024-03-22 RX ADMIN — DIATRIZOATE MEGLUMINE AND DIATRIZOATE SODIUM 20 ML: 660; 100 LIQUID ORAL; RECTAL at 08:27

## 2024-03-26 ENCOUNTER — HOSPITAL ENCOUNTER (EMERGENCY)
Age: 74
Discharge: HOME | End: 2024-03-26
Attending: INTERNAL MEDICINE
Payer: MEDICARE

## 2024-03-26 ENCOUNTER — APPOINTMENT (OUTPATIENT)
Dept: GENERAL RADIOLOGY | Age: 74
End: 2024-03-26
Payer: MEDICARE

## 2024-03-26 VITALS
BODY MASS INDEX: 25 KG/M2 | HEART RATE: 88 BPM | SYSTOLIC BLOOD PRESSURE: 148 MMHG | HEIGHT: 75 IN | RESPIRATION RATE: 18 BRPM | TEMPERATURE: 98.1 F | OXYGEN SATURATION: 96 % | DIASTOLIC BLOOD PRESSURE: 95 MMHG

## 2024-03-26 DIAGNOSIS — J18.9 PNEUMONIA OF RIGHT MIDDLE LOBE DUE TO INFECTIOUS ORGANISM: Primary | ICD-10-CM

## 2024-03-26 LAB
FLUAV RNA RESP QL NAA+PROBE: NOT DETECTED
FLUBV RNA RESP QL NAA+PROBE: NOT DETECTED
RSV AG NOSE QL: NEGATIVE
S PYO AG THROAT QL: NEGATIVE
SARS-COV-2 RNA RESP QL NAA+PROBE: NOT DETECTED

## 2024-03-26 PROCEDURE — 87807 RSV ASSAY W/OPTIC: CPT

## 2024-03-26 PROCEDURE — 99284 EMERGENCY DEPT VISIT MOD MDM: CPT

## 2024-03-26 PROCEDURE — 87081 CULTURE SCREEN ONLY: CPT

## 2024-03-26 PROCEDURE — 87880 STREP A ASSAY W/OPTIC: CPT

## 2024-03-26 PROCEDURE — 87636 SARSCOV2 & INF A&B AMP PRB: CPT

## 2024-03-26 PROCEDURE — 71046 X-RAY EXAM CHEST 2 VIEWS: CPT

## 2024-03-26 PROCEDURE — 6370000000 HC RX 637 (ALT 250 FOR IP): Performed by: INTERNAL MEDICINE

## 2024-03-26 PROCEDURE — 94640 AIRWAY INHALATION TREATMENT: CPT

## 2024-03-26 RX ORDER — IPRATROPIUM BROMIDE AND ALBUTEROL SULFATE 2.5; .5 MG/3ML; MG/3ML
1 SOLUTION RESPIRATORY (INHALATION) ONCE
Status: COMPLETED | OUTPATIENT
Start: 2024-03-26 | End: 2024-03-26

## 2024-03-26 RX ORDER — AZITHROMYCIN 250 MG/1
500 TABLET, FILM COATED ORAL ONCE
Status: COMPLETED | OUTPATIENT
Start: 2024-03-26 | End: 2024-03-26

## 2024-03-26 RX ORDER — ACETAMINOPHEN 500 MG
1000 TABLET ORAL ONCE
Status: COMPLETED | OUTPATIENT
Start: 2024-03-26 | End: 2024-03-26

## 2024-03-26 RX ORDER — AZITHROMYCIN 250 MG/1
250 TABLET, FILM COATED ORAL DAILY
Qty: 4 TABLET | Refills: 0 | Status: SHIPPED | OUTPATIENT
Start: 2024-03-26 | End: 2024-03-30

## 2024-03-26 RX ORDER — ALBUTEROL SULFATE 90 UG/1
2 AEROSOL, METERED RESPIRATORY (INHALATION) 4 TIMES DAILY PRN
Qty: 54 G | Refills: 1 | Status: SHIPPED | OUTPATIENT
Start: 2024-03-26

## 2024-03-26 RX ORDER — GUAIFENESIN 600 MG/1
1200 TABLET, EXTENDED RELEASE ORAL 2 TIMES DAILY
Qty: 40 TABLET | Refills: 0 | Status: SHIPPED | OUTPATIENT
Start: 2024-03-26 | End: 2024-04-05

## 2024-03-26 RX ADMIN — IPRATROPIUM BROMIDE AND ALBUTEROL SULFATE 1 DOSE: 2.5; .5 SOLUTION RESPIRATORY (INHALATION) at 07:16

## 2024-03-26 RX ADMIN — ACETAMINOPHEN 1000 MG: 500 TABLET ORAL at 07:02

## 2024-03-26 RX ADMIN — AZITHROMYCIN MONOHYDRATE 500 MG: 250 TABLET ORAL at 09:21

## 2024-03-26 ASSESSMENT — PAIN SCALES - GENERAL
PAINLEVEL_OUTOF10: 5
PAINLEVEL_OUTOF10: 5

## 2024-03-26 ASSESSMENT — LIFESTYLE VARIABLES
HOW OFTEN DO YOU HAVE A DRINK CONTAINING ALCOHOL: NEVER
HOW MANY STANDARD DRINKS CONTAINING ALCOHOL DO YOU HAVE ON A TYPICAL DAY: PATIENT DOES NOT DRINK

## 2024-03-26 ASSESSMENT — PAIN - FUNCTIONAL ASSESSMENT: PAIN_FUNCTIONAL_ASSESSMENT: 0-10

## 2024-03-26 NOTE — ED NOTES
Patient oriented to room and ED throughput process upon moving to ED room 9.  Safety measures with ED bed locked in lowest position and call light in reach.  Patient educated on all orders, including medications.  Patient educated on chief complaint/symptoms. Patient encouraged to ask questions regarding care, medications or treatment plan.  Patient aware of how to reach staff with questions/concerns.Reviewed discharge instructions, home meds, and follow up care with patient, verbalized understanding.

## 2024-03-26 NOTE — ED PROVIDER NOTES
EMERGENCY MEDICINE PROVIDER NOTE    Patient Identification  Pt Name: Oseas Muniz  MRN: [id number]  Birthdate 1950  Date of evaluation: 3/26/2024  Provider: SERA TIAN DO  PCP: Jhonny Aldana APRN - CNP    Chief Complaint  Illness (Sore throat and congestion for last 10 days, wife is also sick. )      HPI  (History provided by patient)  This is a 73 y.o. male who was brought in by self for chest congestion and chest tightness along with a sore throat and nasal congestion for the past 10 days.  Patient does have a fever and denies chills.  The cough is productive.  Patient denies nausea vomiting.  Patient denies diarrhea.  The chest tightness is mild to moderate in intensity.    I have reviewed the following nursing documentation:  Allergies: Ibuprofen    Past medical history:   Past Medical History:   Diagnosis Date    CAD (coronary artery disease)     Cervical spondylosis 01/21/2020    Chronic renal insufficiency, stage 2 (mild) 11/17/2016    Head pain cephalgia 01/21/2014    Hyperlipidemia     Hypertension     Perennial allergic rhinitis 02/11/2015    Pre-diabetes      Past surgical history:   Past Surgical History:   Procedure Laterality Date    CARDIAC SURGERY      Stents    CHOLECYSTECTOMY      COLONOSCOPY      CORONARY ANGIOPLASTY WITH STENT PLACEMENT  2011    KNEE ARTHROSCOPY      LEFT    ROTATOR CUFF REPAIR      right, and left    TONSILLECTOMY      UPPER GASTROINTESTINAL ENDOSCOPY N/A 8/3/2022    ENDOSCOPIC ULTRASOUND EXAM performed by Omkar Hudson MD at [facility] ENDOSCOPY       Home medications:   Discharge Medication List as of 3/26/2024  9:09 AM        CONTINUE these medications which have NOT CHANGED    Details   ketorolac (TORADOL) 10 MG tablet Take 1 tablet by mouth every 6 hours as needed for Pain, Disp-20 tablet, R-0Normal      omeprazole (PRILOSEC) 20 MG delayed release capsule Take 1 capsule by mouth 2 times daily; take on an empty stomach and eat a meal or snack 45 to 60 minutes

## 2024-03-28 LAB — S PYO THROAT QL CULT: NORMAL

## 2024-04-06 ENCOUNTER — HOSPITAL ENCOUNTER (EMERGENCY)
Age: 74
Discharge: HOME | End: 2024-04-06
Attending: EMERGENCY MEDICINE
Payer: MEDICARE

## 2024-04-06 ENCOUNTER — APPOINTMENT (OUTPATIENT)
Dept: GENERAL RADIOLOGY | Age: 74
End: 2024-04-06
Payer: MEDICARE

## 2024-04-06 VITALS
HEART RATE: 76 BPM | WEIGHT: 200 LBS | DIASTOLIC BLOOD PRESSURE: 99 MMHG | OXYGEN SATURATION: 98 % | BODY MASS INDEX: 24.87 KG/M2 | TEMPERATURE: 97.3 F | SYSTOLIC BLOOD PRESSURE: 181 MMHG | RESPIRATION RATE: 18 BRPM | HEIGHT: 75 IN

## 2024-04-06 DIAGNOSIS — R05.1 ACUTE COUGH: Primary | ICD-10-CM

## 2024-04-06 PROCEDURE — 6370000000 HC RX 637 (ALT 250 FOR IP): Performed by: EMERGENCY MEDICINE

## 2024-04-06 PROCEDURE — 99283 EMERGENCY DEPT VISIT LOW MDM: CPT

## 2024-04-06 PROCEDURE — 71046 X-RAY EXAM CHEST 2 VIEWS: CPT

## 2024-04-06 RX ORDER — BENZONATATE 100 MG/1
200 CAPSULE ORAL ONCE
Status: COMPLETED | OUTPATIENT
Start: 2024-04-06 | End: 2024-04-06

## 2024-04-06 RX ORDER — BENZONATATE 100 MG/1
100 CAPSULE ORAL EVERY 8 HOURS PRN
Qty: 20 CAPSULE | Refills: 0 | Status: SHIPPED | OUTPATIENT
Start: 2024-04-06 | End: 2024-04-13

## 2024-04-06 RX ORDER — METHYLPREDNISOLONE 4 MG/1
TABLET ORAL
Qty: 1 KIT | Refills: 0 | Status: SHIPPED | OUTPATIENT
Start: 2024-04-06 | End: 2024-04-12

## 2024-04-06 RX ADMIN — BENZONATATE 200 MG: 100 CAPSULE ORAL at 06:58

## 2024-04-06 ASSESSMENT — PAIN - FUNCTIONAL ASSESSMENT: PAIN_FUNCTIONAL_ASSESSMENT: NONE - DENIES PAIN

## 2024-04-06 NOTE — ED PROVIDER NOTES
EMERGENCY DEPARTMENT PROVIDER NOTE    Patient Identification  Pt Name: Oseas Muniz  MRN: [id number]  Birthdate 1950  Date of evaluation: 4/6/2024  Provider: Laith Salazar DO  PCP: Jhonny Aldana APRN - CNP    Chief Complaint  Cough (Pt arrives from home. Pt C/O cough. Pt states that he was here about a week ago d/t pneumonia. Pt states that his cough will not let him sleep and keeps him up. Denies pain. Pt states that that he took antibiotics and steroids with no relief. )      HPI  (History provided by patient)  This is a 73 y.o. male with pertinent past medical history of hypertension who was brought in by self for cough.  Patient reports cough is ongoing for the past week, nonbloody however occasionally productive of grayish sputum.  Patient was seen in this emergency department on 3/26/2024 for the symptoms, was diagnosed with pneumonia and completed a course of azithromycin.  He reports overall feeling improved, however his cough has been persistent and keeping him up at night.  He denies any fevers, chills, chest pain or wheezing/shortness of breath.      I have reviewed the following nursing documentation:  Allergies: Ibuprofen    Past medical history:   Past Medical History:   Diagnosis Date    CAD (coronary artery disease)     Cervical spondylosis 01/21/2020    Chronic renal insufficiency, stage 2 (mild) 11/17/2016    Head pain cephalgia 01/21/2014    Hyperlipidemia     Hypertension     Perennial allergic rhinitis 02/11/2015    Pre-diabetes      Past surgical history:   Past Surgical History:   Procedure Laterality Date    CARDIAC SURGERY      Stents    CHOLECYSTECTOMY      COLONOSCOPY      CORONARY ANGIOPLASTY WITH STENT PLACEMENT  2011    KNEE ARTHROSCOPY      LEFT    ROTATOR CUFF REPAIR      right, and left    TONSILLECTOMY      UPPER GASTROINTESTINAL ENDOSCOPY N/A 8/3/2022    ENDOSCOPIC ULTRASOUND EXAM performed by [facility]mkar Hudson MD at [facility] ENDOSCOPY       Home medications:    Discharge Medication List as of 4/6/2024  8:37 AM        CONTINUE these medications which have NOT CHANGED    Details   albuterol sulfate HFA (VENTOLIN HFA) 108 (90 Base) MCG/ACT inhaler Inhale 2 puffs into the lungs 4 times daily as needed for Wheezing, Disp-54 g, R-1Normal      ketorolac (TORADOL) 10 MG tablet Take 1 tablet by mouth every 6 hours as needed for Pain, Disp-20 tablet, R-0Normal      omeprazole (PRILOSEC) 20 MG delayed release capsule Take 1 capsule by mouth 2 times daily; take on an empty stomach and eat a meal or snack 45 to 60 minutes hour after each dose., Disp-180 capsule, R-0Normal      acetaminophen (AMINOFEN) 325 MG tablet Take 2 tablets by mouth every 6 hours as needed for Pain, Disp-120 tablet, R-3Normal      fluticasone (FLONASE) 50 MCG/ACT nasal spray 2 sprays in each nostril daily., Disp-16 g, R-2Normal      rosuvastatin (CRESTOR) 40 MG tablet Take 1 tablet by mouth every eveningHistorical Med      losartan (COZAAR) 50 MG tablet Take 1 tablet by mouth dailyHistorical Med      hydrochlorothiazide (HYDRODIURIL) 25 MG tablet Take 1 tablet by mouth dailyHistorical Med      aspirin 81 MG EC tablet Take 1 tablet by mouth dailyHistorical Med      Psyllium (METAMUCIL PO) Take by mouth daily 1 TABLESPOON DAILYHistorical Med             Social history:  reports that he has never smoked. He has never used smokeless tobacco. He reports that he does not drink alcohol and does not use drugs.    Family history:    Family History   Problem Relation Age of Onset    High Blood Pressure Mother     Emphysema Mother     High Blood Pressure Father     Cancer Father     No Known Problems Brother     No Known Problems Brother          Exam  ED Triage Vitals [04/06/24 0556]   BP Temp Temp Source Pulse Respirations SpO2 Height Weight - Scale   (!) 181/99 97.3 °F (36.3 °C) Oral 76 18 98 % 1.905 m (6' 3\") 90.7 kg (200 lb)     Nursing note and vitals reviewed.  Constitutional: Well developed, well nourished.

## 2024-04-29 ENCOUNTER — HOSPITAL ENCOUNTER (EMERGENCY)
Age: 74
Discharge: HOME | End: 2024-04-29
Attending: EMERGENCY MEDICINE
Payer: MEDICARE

## 2024-04-29 ENCOUNTER — APPOINTMENT (OUTPATIENT)
Dept: CT IMAGING | Age: 74
End: 2024-04-29
Payer: MEDICARE

## 2024-04-29 ENCOUNTER — APPOINTMENT (OUTPATIENT)
Dept: GENERAL RADIOLOGY | Age: 74
End: 2024-04-29
Payer: MEDICARE

## 2024-04-29 VITALS
OXYGEN SATURATION: 99 % | HEART RATE: 59 BPM | TEMPERATURE: 98.7 F | RESPIRATION RATE: 14 BRPM | WEIGHT: 203.7 LBS | HEIGHT: 75 IN | BODY MASS INDEX: 25.33 KG/M2 | DIASTOLIC BLOOD PRESSURE: 70 MMHG | SYSTOLIC BLOOD PRESSURE: 140 MMHG

## 2024-04-29 DIAGNOSIS — R05.3 CHRONIC COUGH: Primary | ICD-10-CM

## 2024-04-29 DIAGNOSIS — R91.1 PULMONARY NODULE: ICD-10-CM

## 2024-04-29 LAB
ALBUMIN SERPL-MCNC: 3.7 G/DL (ref 3.4–5)
ALBUMIN/GLOB SERPL: 1.1 {RATIO} (ref 1.1–2.2)
ALP SERPL-CCNC: 91 U/L (ref 40–129)
ALT SERPL-CCNC: 28 U/L (ref 10–40)
ANION GAP SERPL CALCULATED.3IONS-SCNC: 12 MMOL/L (ref 3–16)
AST SERPL-CCNC: 26 U/L (ref 15–37)
BASOPHILS # BLD: 0 K/UL (ref 0–0.2)
BASOPHILS NFR BLD: 0.8 %
BILIRUB SERPL-MCNC: 0.4 MG/DL (ref 0–1)
BUN SERPL-MCNC: 19 MG/DL (ref 7–20)
CALCIUM SERPL-MCNC: 9.1 MG/DL (ref 8.3–10.6)
CHLORIDE SERPL-SCNC: 103 MMOL/L (ref 99–110)
CO2 SERPL-SCNC: 25 MMOL/L (ref 21–32)
CREAT SERPL-MCNC: 1.5 MG/DL (ref 0.8–1.3)
DEPRECATED RDW RBC AUTO: 13.9 % (ref 12.4–15.4)
EKG ATRIAL RATE: 51 BPM
EKG DIAGNOSIS: NORMAL
EKG P AXIS: 46 DEGREES
EKG P-R INTERVAL: 150 MS
EKG Q-T INTERVAL: 420 MS
EKG QRS DURATION: 88 MS
EKG QTC CALCULATION (BAZETT): 387 MS
EKG R AXIS: 58 DEGREES
EKG T AXIS: 40 DEGREES
EKG VENTRICULAR RATE: 51 BPM
EOSINOPHIL # BLD: 0.1 K/UL (ref 0–0.6)
EOSINOPHIL NFR BLD: 1.3 %
GFR SERPLBLD CREATININE-BSD FMLA CKD-EPI: 49 ML/MIN/{1.73_M2}
GLUCOSE SERPL-MCNC: 103 MG/DL (ref 70–99)
HCT VFR BLD AUTO: 49.7 % (ref 40.5–52.5)
HGB BLD-MCNC: 16.3 G/DL (ref 13.5–17.5)
LYMPHOCYTES # BLD: 1.8 K/UL (ref 1–5.1)
LYMPHOCYTES NFR BLD: 31.7 %
MCH RBC QN AUTO: 30.3 PG (ref 26–34)
MCHC RBC AUTO-ENTMCNC: 32.8 G/DL (ref 31–36)
MCV RBC AUTO: 92.5 FL (ref 80–100)
MONOCYTES # BLD: 0.8 K/UL (ref 0–1.3)
MONOCYTES NFR BLD: 13 %
NEUTROPHILS # BLD: 3.1 K/UL (ref 1.7–7.7)
NEUTROPHILS NFR BLD: 53.2 %
NT-PROBNP SERPL-MCNC: <36 PG/ML (ref 0–124)
PLATELET # BLD AUTO: 111 K/UL (ref 135–450)
PMV BLD AUTO: 8.8 FL (ref 5–10.5)
POTASSIUM SERPL-SCNC: 3.7 MMOL/L (ref 3.5–5.1)
PROT SERPL-MCNC: 7.1 G/DL (ref 6.4–8.2)
RBC # BLD AUTO: 5.38 M/UL (ref 4.2–5.9)
SODIUM SERPL-SCNC: 140 MMOL/L (ref 136–145)
TROPONIN, HIGH SENSITIVITY: 10 NG/L (ref 0–22)
WBC # BLD AUTO: 5.8 K/UL (ref 4–11)

## 2024-04-29 PROCEDURE — 6360000004 HC RX CONTRAST MEDICATION: Performed by: EMERGENCY MEDICINE

## 2024-04-29 PROCEDURE — 84484 ASSAY OF TROPONIN QUANT: CPT

## 2024-04-29 PROCEDURE — 85025 COMPLETE CBC W/AUTO DIFF WBC: CPT

## 2024-04-29 PROCEDURE — 99285 EMERGENCY DEPT VISIT HI MDM: CPT

## 2024-04-29 PROCEDURE — 71045 X-RAY EXAM CHEST 1 VIEW: CPT

## 2024-04-29 PROCEDURE — 94640 AIRWAY INHALATION TREATMENT: CPT

## 2024-04-29 PROCEDURE — 93010 ELECTROCARDIOGRAM REPORT: CPT | Performed by: INTERNAL MEDICINE

## 2024-04-29 PROCEDURE — 6370000000 HC RX 637 (ALT 250 FOR IP): Performed by: EMERGENCY MEDICINE

## 2024-04-29 PROCEDURE — 80053 COMPREHEN METABOLIC PANEL: CPT

## 2024-04-29 PROCEDURE — 83880 ASSAY OF NATRIURETIC PEPTIDE: CPT

## 2024-04-29 PROCEDURE — 71260 CT THORAX DX C+: CPT

## 2024-04-29 PROCEDURE — 93005 ELECTROCARDIOGRAM TRACING: CPT | Performed by: EMERGENCY MEDICINE

## 2024-04-29 RX ORDER — IPRATROPIUM BROMIDE AND ALBUTEROL SULFATE 2.5; .5 MG/3ML; MG/3ML
1 SOLUTION RESPIRATORY (INHALATION) ONCE
Status: COMPLETED | OUTPATIENT
Start: 2024-04-29 | End: 2024-04-29

## 2024-04-29 RX ADMIN — IPRATROPIUM BROMIDE AND ALBUTEROL SULFATE 1 DOSE: .5; 3 SOLUTION RESPIRATORY (INHALATION) at 07:07

## 2024-04-29 RX ADMIN — IOPAMIDOL 75 ML: 755 INJECTION, SOLUTION INTRAVENOUS at 09:33

## 2024-04-29 ASSESSMENT — PAIN DESCRIPTION - LOCATION: LOCATION: THROAT

## 2024-04-29 ASSESSMENT — PAIN DESCRIPTION - DESCRIPTORS: DESCRIPTORS: SORE

## 2024-04-29 ASSESSMENT — PAIN - FUNCTIONAL ASSESSMENT: PAIN_FUNCTIONAL_ASSESSMENT: 0-10

## 2024-04-29 ASSESSMENT — LIFESTYLE VARIABLES: HOW OFTEN DO YOU HAVE A DRINK CONTAINING ALCOHOL: NEVER

## 2024-04-29 ASSESSMENT — PAIN SCALES - GENERAL: PAINLEVEL_OUTOF10: 8

## 2024-04-29 NOTE — ED NOTES
Patient resting in bed with monitors in place and call light in reach. Patient denies any needs at this time, updated on plan of care and agreeable at this time. t

## 2024-04-29 NOTE — ED NOTES
Patient oriented to room and ED throughput process.  Safety measures with ED bed locked in lowest position and call light in reach.  Patient educated on all orders, including any medications.  Patient educated on chief complaint/symptoms. Patient encouraged to ask questions regarding care, medications or treatment plan.  Patient aware of how to reach staff with questions/concerns.

## 2024-04-29 NOTE — DISCHARGE INSTRUCTIONS
Lung Nodules    Your x-ray or CT scan shows a nodule (\"spot\") on your lung.  This will require follow-up for further evaluation.    Please call your Primary Care Physician (PCP) if you have already established one and you confirmed your PCP during your ER visit today. If you do not have a PCP, please call the [facility] scheduling number to schedule your Emergency Department follow up visit. Please mention that you are scheduling an \"ER follow up visit\" for a lung nodule.      Learning About Lung Nodules  What is a lung nodule?  A lung nodule is a growth in the lung. A single nodule surrounded by lung tissue is called a solitary pulmonary nodule.  A lung nodule might not cause any symptoms. Your doctor may have found one or more nodules on your lung when you were having a chest X-ray or CT scan. Or it may have been found during a lung cancer screening.  A lung nodule may be caused by an old infection or cancer. It might also be a noncancerous growth.  Lung nodules can cause a screening to give an abnormal result. Most nodules do not cause any harm. But without further tests, your doctor can't tell whether an abnormal finding is cancer, a harmless nodule, or something else.  What can you expect when you have a lung nodule?  Your doctor will look at several risk factors to see how likely it is that the nodule is cancer. He or she will look at:  Whether you smoke or have ever smoked.  Your age and your family's medical history.  Whether you have ever had lung cancer.  The size and shape of the nodule.  Whether the nodule has changed in size. Your doctor may look at past chest X-rays or CT scans, if available, and compare them. Or you may have a series of CT scans to see if the nodule grows over time.  What happens next depends on the risk of the nodule being cancer.  If you have no risk factors and the nodule is small, your doctor may advise doing nothing.  If the risk is small, your doctor may schedule

## 2024-04-29 NOTE — ED NOTES
Quality 431: Preventive Care And Screening: Unhealthy Alcohol Use - Screening: Patient screened for unhealthy alcohol use using a single question and scores less than 2 times per year RT called at this time for breathing treatment.    Quality 226: Preventive Care And Screening: Tobacco Use: Screening And Cessation Intervention: Patient screened for tobacco and never smoked Quality 130: Documentation Of Current Medications In The Medical Record: Current Medications Documented Detail Level: Detailed

## 2024-05-01 ENCOUNTER — OFFICE VISIT (OUTPATIENT)
Dept: PULMONOLOGY | Age: 74
End: 2024-05-01
Payer: MEDICARE

## 2024-05-01 VITALS
BODY MASS INDEX: 25.24 KG/M2 | HEART RATE: 95 BPM | WEIGHT: 203 LBS | OXYGEN SATURATION: 98 % | SYSTOLIC BLOOD PRESSURE: 142 MMHG | DIASTOLIC BLOOD PRESSURE: 72 MMHG | HEIGHT: 75 IN

## 2024-05-01 DIAGNOSIS — R05.8 POST-VIRAL COUGH SYNDROME: Primary | ICD-10-CM

## 2024-05-01 PROBLEM — R05.3 CHRONIC COUGH: Status: ACTIVE | Noted: 2024-05-01

## 2024-05-01 PROCEDURE — 1036F TOBACCO NON-USER: CPT | Performed by: INTERNAL MEDICINE

## 2024-05-01 PROCEDURE — 1123F ACP DISCUSS/DSCN MKR DOCD: CPT | Performed by: INTERNAL MEDICINE

## 2024-05-01 PROCEDURE — 3077F SYST BP >= 140 MM HG: CPT | Performed by: INTERNAL MEDICINE

## 2024-05-01 PROCEDURE — 3017F COLORECTAL CA SCREEN DOC REV: CPT | Performed by: INTERNAL MEDICINE

## 2024-05-01 PROCEDURE — 3078F DIAST BP <80 MM HG: CPT | Performed by: INTERNAL MEDICINE

## 2024-05-01 PROCEDURE — 99204 OFFICE O/P NEW MOD 45 MIN: CPT | Performed by: INTERNAL MEDICINE

## 2024-05-01 PROCEDURE — G8427 DOCREV CUR MEDS BY ELIG CLIN: HCPCS | Performed by: INTERNAL MEDICINE

## 2024-05-01 PROCEDURE — G8417 CALC BMI ABV UP PARAM F/U: HCPCS | Performed by: INTERNAL MEDICINE

## 2024-05-01 RX ORDER — DEXTROMETHORPHAN HYDROBROMIDE AND PROMETHAZINE HYDROCHLORIDE 15; 6.25 MG/5ML; MG/5ML
5 SYRUP ORAL EVERY 4 HOURS PRN
COMMUNITY
Start: 2024-04-15

## 2024-05-01 RX ORDER — BUDESONIDE AND FORMOTEROL FUMARATE DIHYDRATE 160; 4.5 UG/1; UG/1
2 AEROSOL RESPIRATORY (INHALATION) 2 TIMES DAILY
Qty: 1 EACH | Refills: 5 | Status: SHIPPED | OUTPATIENT
Start: 2024-05-01

## 2024-05-01 RX ORDER — LEVOFLOXACIN 500 MG/1
500 TABLET, FILM COATED ORAL DAILY
COMMUNITY

## 2024-05-01 ASSESSMENT — ENCOUNTER SYMPTOMS
DIARRHEA: 0
CONSTIPATION: 0
NAUSEA: 0
ABDOMINAL PAIN: 0
SINUS PRESSURE: 0

## 2024-05-01 NOTE — PROGRESS NOTES
[facility]  Consult Note  Usman Joseph MD       Oseas Muniz   YOB: 1950    Date of Visit:  5/1/2024    Assessment/Plan:  1. Post-viral cough syndrome  2.  Pulmonary nodule    I reviewed medical records, labs and imaging from his 3 emergency room visits.  Most recently, he had CT scan of the chest.  This scan did not reveal evidence of infiltrate.  No obvious cause of cough is noted.  He did have a right lower lobe pulmonary nodule, however.  On his first ER visit, the patient did have testing for RSV, COVID and influenza.  This was negative.  This testing was not repeated on subsequent ER visits, however.    He will need follow-up imaging for the nodule.    I suspected initiation of this was viral.  Lingering cough is relatively common in the setting.    He is currently on Levaquin and a Medrol Dosepak.  Will have him finish these treatments.  Try adding Symbicort 160/4.5, 2 puffs twice daily.  I gave him a spacer to her and specific instructions on proper technique.  Also discussed with him that he might just have to write it out.  While it can take some time, these postviral coughs generally do resolve.    Will have him back in 1 month.  Discuss follow-up CT imaging at his follow-up appointment.      Chief Complaint   Patient presents with    Cough     CHRONIC X 6 WEEKS        HPI  The patient presents today with a chief complaint of cough.  He has been in the ER 3 times since the end of March with complaints of cough.  At one time, there was concern he may have had pneumonia though no infiltrate is noted on imaging.  He has had several rounds of antibiotics and steroids with no benefit.  He is also had Tessalon Perles and promethazine cough elixir with no benefit.  His cough is worse at night.  However, he denies other symptoms of GERD.  He has no prior history of asthma.  He is a lifetime non-smoker.    Review of Systems  Review of Systems

## 2024-05-31 ENCOUNTER — OFFICE VISIT (OUTPATIENT)
Dept: PULMONOLOGY | Age: 74
End: 2024-05-31
Payer: MEDICARE

## 2024-05-31 VITALS
BODY MASS INDEX: 25.22 KG/M2 | HEART RATE: 63 BPM | SYSTOLIC BLOOD PRESSURE: 136 MMHG | OXYGEN SATURATION: 98 % | WEIGHT: 202.8 LBS | DIASTOLIC BLOOD PRESSURE: 72 MMHG | HEIGHT: 75 IN

## 2024-05-31 DIAGNOSIS — R05.8 POST-VIRAL COUGH SYNDROME: Primary | ICD-10-CM

## 2024-05-31 DIAGNOSIS — R91.1 PULMONARY NODULE: ICD-10-CM

## 2024-05-31 PROCEDURE — G8427 DOCREV CUR MEDS BY ELIG CLIN: HCPCS | Performed by: INTERNAL MEDICINE

## 2024-05-31 PROCEDURE — 3017F COLORECTAL CA SCREEN DOC REV: CPT | Performed by: INTERNAL MEDICINE

## 2024-05-31 PROCEDURE — 3078F DIAST BP <80 MM HG: CPT | Performed by: INTERNAL MEDICINE

## 2024-05-31 PROCEDURE — 1123F ACP DISCUSS/DSCN MKR DOCD: CPT | Performed by: INTERNAL MEDICINE

## 2024-05-31 PROCEDURE — 99213 OFFICE O/P EST LOW 20 MIN: CPT | Performed by: INTERNAL MEDICINE

## 2024-05-31 PROCEDURE — G8417 CALC BMI ABV UP PARAM F/U: HCPCS | Performed by: INTERNAL MEDICINE

## 2024-05-31 PROCEDURE — 3075F SYST BP GE 130 - 139MM HG: CPT | Performed by: INTERNAL MEDICINE

## 2024-05-31 PROCEDURE — 1036F TOBACCO NON-USER: CPT | Performed by: INTERNAL MEDICINE

## 2024-05-31 NOTE — PROGRESS NOTES
[facility]  Consult Note  Usman Joseph MD       Oseas Muniz   YOB: 1950    Date of Visit:  5/31/2024    Assessment/Plan:  1. Post-viral cough syndrome  2.  Pulmonary nodule    Patient was taking his Symbicort.  Felt no benefit to this  However, when I reviewed technique with him, he was doing this improperly.  I again went through detailed instructions on proper technique.  Have him give Symbicort another try.    Again discussed with him that we may just have to write out the cough.  He has been on about everything and really has not had any benefit    Have him repeat CT imaging around the beginning of August to follow-up the 12 mm noncalcified pulmonary nodule.    Follow-up in the office after CT imaging is complete      Chief Complaint   Patient presents with    Cough     Occurs when he is drinking or gargling with mouthwash   Productive at times of white/ clear phlegm     Chest Pain     Left side, rib area when he coughs    Sore Throat     \"Feels raw\" when coughing       HPI  The patient presents today with a chief complaint of cough.  He has been in the ER 3 times since the end of March with complaints of cough.  At one time, there was concern he may have had pneumonia though no infiltrate is noted on imaging.  He has had several rounds of antibiotics and steroids with no benefit.  He is also had Tessalon Perles and promethazine cough elixir with no benefit.  His cough is worse at night.  However, he denies other symptoms of GERD.  He has no prior history of asthma.  He is a lifetime non-smoker.    He took Symbicort over the last one month without benefit. His cough is triggered by his throat and sometimes when he blows his nose.    Review of Systems  No Chest pain, Nausea or vomiting reported      History  I have reviewed past medical, surgical, social and family history. This is documented elsewhere in themedical record.     Physical

## 2024-06-07 ENCOUNTER — TELEPHONE (OUTPATIENT)
Dept: PULMONOLOGY | Age: 74
End: 2024-06-07

## 2024-06-07 NOTE — TELEPHONE ENCOUNTER
Pt called and said he has a cough.  Said when he takes a deep breath it makes him cough.  He asked what the difference between ct/mri.  Said he has already had a ct and wants to know if a mri would be better.  Also mention pft, if that would benefit.    [phone]

## 2024-06-07 NOTE — TELEPHONE ENCOUNTER
Patient is having sob his o2 sats are running normal , cough sometimes productive with clear mucus worse at night , chest discomfort, and sore throat he states its hard to swallow.  Patient denies fever, chills, and wheezing. The patient last seen Dr Joseph on 05/31/2024 for similar issues

## 2024-07-16 ENCOUNTER — HOSPITAL ENCOUNTER (OUTPATIENT)
Dept: CT IMAGING | Age: 74
Discharge: HOME | End: 2024-07-16
Attending: INTERNAL MEDICINE
Payer: MEDICARE

## 2024-07-16 DIAGNOSIS — R91.1 PULMONARY NODULE: ICD-10-CM

## 2024-07-16 PROCEDURE — 71250 CT THORAX DX C-: CPT

## 2024-07-17 DIAGNOSIS — R91.1 PULMONARY NODULE: Primary | ICD-10-CM

## 2024-09-09 ENCOUNTER — OFFICE VISIT (OUTPATIENT)
Dept: PULMONOLOGY | Age: 74
End: 2024-09-09
Payer: MEDICARE

## 2024-09-09 VITALS
HEIGHT: 75 IN | DIASTOLIC BLOOD PRESSURE: 74 MMHG | BODY MASS INDEX: 25.96 KG/M2 | OXYGEN SATURATION: 98 % | HEART RATE: 61 BPM | WEIGHT: 208.8 LBS | SYSTOLIC BLOOD PRESSURE: 132 MMHG

## 2024-09-09 DIAGNOSIS — R91.1 PULMONARY NODULE: Primary | ICD-10-CM

## 2024-09-09 DIAGNOSIS — J45.20 MILD INTERMITTENT REACTIVE AIRWAY DISEASE WITHOUT COMPLICATION: ICD-10-CM

## 2024-09-09 DIAGNOSIS — R05.8 POST-VIRAL COUGH SYNDROME: ICD-10-CM

## 2024-09-09 PROBLEM — J45.909 REACTIVE AIRWAY DISEASE WITHOUT COMPLICATION: Status: ACTIVE | Noted: 2024-09-09

## 2024-09-09 PROCEDURE — G8417 CALC BMI ABV UP PARAM F/U: HCPCS | Performed by: INTERNAL MEDICINE

## 2024-09-09 PROCEDURE — 1123F ACP DISCUSS/DSCN MKR DOCD: CPT | Performed by: INTERNAL MEDICINE

## 2024-09-09 PROCEDURE — G8427 DOCREV CUR MEDS BY ELIG CLIN: HCPCS | Performed by: INTERNAL MEDICINE

## 2024-09-09 PROCEDURE — 3078F DIAST BP <80 MM HG: CPT | Performed by: INTERNAL MEDICINE

## 2024-09-09 PROCEDURE — 99213 OFFICE O/P EST LOW 20 MIN: CPT | Performed by: INTERNAL MEDICINE

## 2024-09-09 PROCEDURE — 3075F SYST BP GE 130 - 139MM HG: CPT | Performed by: INTERNAL MEDICINE

## 2024-09-09 PROCEDURE — 3017F COLORECTAL CA SCREEN DOC REV: CPT | Performed by: INTERNAL MEDICINE

## 2024-09-09 PROCEDURE — 1036F TOBACCO NON-USER: CPT | Performed by: INTERNAL MEDICINE

## 2024-09-19 ENCOUNTER — HOSPITAL ENCOUNTER (OUTPATIENT)
Dept: PULMONOLOGY | Age: 74
Discharge: HOME | End: 2024-09-19
Attending: INTERNAL MEDICINE
Payer: MEDICARE

## 2024-09-19 VITALS — RESPIRATION RATE: 16 BRPM | OXYGEN SATURATION: 100 % | HEART RATE: 57 BPM

## 2024-09-19 DIAGNOSIS — J45.20 MILD INTERMITTENT REACTIVE AIRWAY DISEASE WITHOUT COMPLICATION: ICD-10-CM

## 2024-09-19 PROCEDURE — 6360000002 HC RX W HCPCS: Performed by: INTERNAL MEDICINE

## 2024-09-19 PROCEDURE — 6370000000 HC RX 637 (ALT 250 FOR IP): Performed by: INTERNAL MEDICINE

## 2024-09-19 PROCEDURE — 94726 PLETHYSMOGRAPHY LUNG VOLUMES: CPT

## 2024-09-19 PROCEDURE — 94070 EVALUATION OF WHEEZING: CPT

## 2024-09-19 PROCEDURE — 94729 DIFFUSING CAPACITY: CPT

## 2024-09-19 PROCEDURE — 94760 N-INVAS EAR/PLS OXIMETRY 1: CPT

## 2024-09-19 PROCEDURE — 94010 BREATHING CAPACITY TEST: CPT

## 2024-09-19 RX ORDER — ALBUTEROL SULFATE 0.83 MG/ML
2.5 SOLUTION RESPIRATORY (INHALATION) ONCE
Status: COMPLETED | OUTPATIENT
Start: 2024-09-19 | End: 2024-09-19

## 2024-09-19 RX ORDER — SODIUM CHLORIDE FOR INHALATION 0.9 %
3 VIAL, NEBULIZER (ML) INHALATION ONCE
Status: COMPLETED | OUTPATIENT
Start: 2024-09-19 | End: 2024-09-19

## 2024-09-19 RX ADMIN — METHACHOLINE CHLORIDE 1 MG: 100 POWDER, FOR SOLUTION RESPIRATORY (INHALATION) at 10:51

## 2024-09-19 RX ADMIN — Medication 3 ML: at 10:34

## 2024-09-19 RX ADMIN — METHACHOLINE CHLORIDE 4 MG: 100 POWDER, FOR SOLUTION RESPIRATORY (INHALATION) at 10:57

## 2024-09-19 RX ADMIN — METHACHOLINE CHLORIDE 0.06 MG: 100 POWDER, FOR SOLUTION RESPIRATORY (INHALATION) at 10:38

## 2024-09-19 RX ADMIN — METHACHOLINE CHLORIDE 16 MG: 100 POWDER, FOR SOLUTION RESPIRATORY (INHALATION) at 11:00

## 2024-09-19 RX ADMIN — ALBUTEROL SULFATE 2.5 MG: 2.5 SOLUTION RESPIRATORY (INHALATION) at 11:05

## 2024-09-19 RX ADMIN — METHACHOLINE CHLORIDE 0.25 MG: 100 POWDER, FOR SOLUTION RESPIRATORY (INHALATION) at 10:47

## 2024-10-30 ENCOUNTER — OFFICE VISIT (OUTPATIENT)
Dept: ENT CLINIC | Age: 74
End: 2024-10-30

## 2024-10-30 VITALS
DIASTOLIC BLOOD PRESSURE: 81 MMHG | HEIGHT: 75 IN | WEIGHT: 210 LBS | SYSTOLIC BLOOD PRESSURE: 127 MMHG | TEMPERATURE: 97.5 F | OXYGEN SATURATION: 97 % | HEART RATE: 71 BPM | BODY MASS INDEX: 26.11 KG/M2

## 2024-10-30 DIAGNOSIS — K21.9 LPRD (LARYNGOPHARYNGEAL REFLUX DISEASE): ICD-10-CM

## 2024-10-30 DIAGNOSIS — J32.9 CHRONIC SINUSITIS, UNSPECIFIED LOCATION: ICD-10-CM

## 2024-10-30 DIAGNOSIS — J30.1 SEASONAL ALLERGIC RHINITIS DUE TO POLLEN: ICD-10-CM

## 2024-10-30 DIAGNOSIS — R13.10 DYSPHAGIA, UNSPECIFIED TYPE: Primary | ICD-10-CM

## 2024-10-30 NOTE — PROGRESS NOTES
CHIEF COMPLAINT  Chief Complaint   Patient presents with    Dysphagia    phlegm in throat       HISTORY OF PRESENT ILLNESS     Oesas Munzi is a 74 y.o. male who presented today for evaluation and management for a throat problem.  Patient stated that he is having a \"lot of problems with swallowing, terrible mucus build up, and it affects my breathing sometimes.  I had pneumonia in May 2024.  I had a swallowing test.  I saw Dr Genaro Myers who sent me over to [facility] and I went once.  The voice therapy didn't help.  Sinuses are terrible.  Constantly blowing mucus out. I clear it out and later it all comes back.  Throat mucus builds up and I clear it out, but it comes back.\"  He reported a sore throat, which he feels is due to irritation from trying to clear his throat.        REVIEW OF SYSTEMS  Constitutional:  Denied fever and chills.  ENT/sinus:  Denied otalgia, otorrhea, nasal pain, rhinorrhea,  and sinus/facial pain.        EXAMINATION    Vitals:    10/30/24 0800   BP: 127/81   Site: Left Upper Arm   Position: Sitting   Cuff Size: Large Adult   Pulse: 71   Temp: 97.5 °F (36.4 °C)   TempSrc: Infrared   SpO2: 97%   Weight: 95.3 kg (210 lb)   Height: 1.905 m (6' 3\")     WDWN, NAD  Face:  Normal skin.    Voice:  Normal, with no hoarseness, breathiness, or hot potato quality.  Ears:   TMs and EACs were normal.  The mastoids and pinnae were normal.    Nose:  Normal.    Sinuses: Nontender x 4   Throat,  OC/OP:  Normal.    Neck:  NT, No masses.  Trachea midline.    Nodes:  No lymphadenopathy.     Thyroid:  Normal       I performed this physical exam personally.      PROCEDURE:  FLEXIBLE FIBEROPTIC NASOPHARYNGOLARYNGOSCOPY    INDICATION  Inadequate visualization by indirect laryngoscopy mirror examination and need for detailed endoscopic examination of the larynx and pharynx to evaluate the upper aerodigestive tract for  etiology of difficulty swallowing and excessive throat mucus.  Due to the patient's

## 2024-11-01 ENCOUNTER — OFFICE VISIT (OUTPATIENT)
Dept: PULMONOLOGY | Age: 74
End: 2024-11-01

## 2024-11-01 VITALS
BODY MASS INDEX: 26.24 KG/M2 | HEART RATE: 70 BPM | WEIGHT: 211 LBS | HEIGHT: 75 IN | OXYGEN SATURATION: 98 % | SYSTOLIC BLOOD PRESSURE: 138 MMHG | DIASTOLIC BLOOD PRESSURE: 86 MMHG

## 2024-11-01 DIAGNOSIS — R91.1 PULMONARY NODULE: Primary | ICD-10-CM

## 2024-11-01 DIAGNOSIS — R05.8 POST-VIRAL COUGH SYNDROME: ICD-10-CM

## 2024-11-01 NOTE — PROGRESS NOTES
Cough Yes Toribio Hodges MD   budesonide-formoterol (SYMBICORT) 160-4.5 MCG/ACT AERO Inhale 2 puffs into the lungs 2 times daily Yes Usman Joseph MD   albuterol sulfate HFA (VENTOLIN HFA) 108 (90 Base) MCG/ACT inhaler Inhale 2 puffs into the lungs 4 times daily as needed for Wheezing Yes Jace Marroquin DO   ketorolac (TORADOL) 10 MG tablet Take 1 tablet by mouth every 6 hours as needed for Pain Yes Nitin Villafana PA   omeprazole (PRILOSEC) 20 MG delayed release capsule Take 1 capsule by mouth 2 times daily; take on an empty stomach and eat a meal or snack 45 to 60 minutes hour after each dose. Yes Curt Leblanc MD   acetaminophen (AMINOFEN) 325 MG tablet Take 2 tablets by mouth every 6 hours as needed for Pain Yes Leyla Saeed MD   fluticasone (FLONASE) 50 MCG/ACT nasal spray 2 sprays in each nostril daily. Yes Curt Leblanc MD   rosuvastatin (CRESTOR) 40 MG tablet Take 1 tablet by mouth every evening Yes Toribio Hodges MD   hydrochlorothiazide (HYDRODIURIL) 25 MG tablet Take 1 tablet by mouth daily Yes Toribio Hodges MD   aspirin 81 MG EC tablet Take 1 tablet by mouth daily Yes Toribio Hodges MD   Psyllium (METAMUCIL PO) Take by mouth daily 1 TABLESPOON DAILY Yes Toribio Hodges MD   losartan (COZAAR) 50 MG tablet Take 1 tablet by mouth daily  ProviderToribio MD       Vitals:    11/01/24 1502   BP: 138/86   Site: Right Upper Arm   Position: Sitting   Cuff Size: Medium Adult   Pulse: 70   SpO2: 98%   Weight: 95.7 kg (211 lb)   Height: 1.905 m (6' 3\")     Body mass index is 26.37 kg/m².     Wt Readings from Last 3 Encounters:   11/01/24 95.7 kg (211 lb)   10/30/24 95.3 kg (210 lb)   09/09/24 94.7 kg (208 lb 12.8 oz)     BP Readings from Last 3 Encounters:   11/01/24 138/86   10/30/24 127/81   09/09/24 132/74        Social History     Tobacco Use   Smoking Status Never    Passive exposure: Past   Smokeless Tobacco Never

## 2024-11-05 ENCOUNTER — HOSPITAL ENCOUNTER (OUTPATIENT)
Dept: CT IMAGING | Age: 74
Discharge: HOME | End: 2024-11-05
Payer: MEDICARE

## 2024-11-05 DIAGNOSIS — J32.9 CHRONIC SINUSITIS, UNSPECIFIED LOCATION: ICD-10-CM

## 2024-11-05 PROCEDURE — 70486 CT MAXILLOFACIAL W/O DYE: CPT

## 2024-11-14 ENCOUNTER — HOSPITAL ENCOUNTER (OUTPATIENT)
Dept: GENERAL RADIOLOGY | Age: 74
Discharge: HOME | End: 2024-11-14
Attending: INTERNAL MEDICINE
Payer: MEDICARE

## 2024-11-14 ENCOUNTER — APPOINTMENT (OUTPATIENT)
Dept: GENERAL RADIOLOGY | Age: 74
End: 2024-11-14
Attending: INTERNAL MEDICINE
Payer: MEDICARE

## 2024-11-14 ENCOUNTER — HOSPITAL ENCOUNTER (OUTPATIENT)
Dept: SPEECH THERAPY | Age: 74
Setting detail: THERAPIES SERIES
Discharge: HOME | End: 2024-11-14
Attending: INTERNAL MEDICINE
Payer: MEDICARE

## 2024-11-14 DIAGNOSIS — R13.19 OTHER DYSPHAGIA: ICD-10-CM

## 2024-11-14 PROCEDURE — 74230 X-RAY XM SWLNG FUNCJ C+: CPT

## 2024-11-14 PROCEDURE — 74220 X-RAY XM ESOPHAGUS 1CNTRST: CPT

## 2024-11-14 PROCEDURE — 92611 MOTION FLUOROSCOPY/SWALLOW: CPT

## 2024-11-14 NOTE — PROCEDURES
features of swallow as pt fatigued.   SLP therapy:  f/u pending GI and neuro follow up if warranted    Aspiration/Penetration Risk:  Inconsistent episodes of shallow penetration of liquids before/ during swallow with complete clearance with completion of swallow. Pt with sensation  No aspiration observed    Recommendations:    Diet Level:   Regular texture diet  as desired with Thin liquids     Strategies: Alternate solids/liquids , Upright as possible with all PO intake , Small bites/sips , Swallow 2 times per bite , Eat/feed slowly, Remain upright 30-45 min ; frequent small meals versus 3 large meals      Dysphagia Prognosis: good, guarded  Barriers/Considerations: co-morbidities, compliance/behavioral, esophageal involvements, prior level of function, fatigue .Recommending neuro work up if not already considered    TEST DATA:  Vision: Adequate for assessment/tx needs  Hearing: Adequate for assessment/tx needs      Oral Phase  Weak Lingual Manipulation:  as test progressed inconsistent reduced coordination of thin liquids and mixed food/liquid consistencies  Piecemeal swallows foods      Pharyngeal Phase  Pooling of material to pharynx during oral phase: noted inconsistently with thin liquids and mixed food/liquid to level of valleculae and inconsistently beyond to UES  Delayed initiation of swallow  : became more evident as test progressed  Reduced Pharyngeal Peristalsis:   Pharyngeal Residue - Pyriform: inconsistent but appeared related to late UES opening and narrow UES opening    Penetration: inconsistent shallow penetration which cleared with spontaneous swallow. Pt with sensation  Fatigue of Mechanism: Muscle fatigue (?Esophageal >Pharyngeal)  Comment:  Please refer to Radiologist documentation of noted :  \"There is a tiny 4 mm left lateral pharyngeal diverticulum which flash fills with barium and immediately clears\".     Upper Esophageal Phase   Upper Esophageal Screen- Major Contributing Deficits  Delayed

## 2024-12-02 ENCOUNTER — HOSPITAL ENCOUNTER (EMERGENCY)
Age: 74
Discharge: HOME | End: 2024-12-02
Attending: EMERGENCY MEDICINE
Payer: MEDICARE

## 2024-12-02 ENCOUNTER — APPOINTMENT (OUTPATIENT)
Dept: GENERAL RADIOLOGY | Age: 74
End: 2024-12-02
Payer: MEDICARE

## 2024-12-02 VITALS
WEIGHT: 210 LBS | OXYGEN SATURATION: 99 % | RESPIRATION RATE: 16 BRPM | BODY MASS INDEX: 26.25 KG/M2 | SYSTOLIC BLOOD PRESSURE: 147 MMHG | TEMPERATURE: 98.4 F | DIASTOLIC BLOOD PRESSURE: 72 MMHG | HEART RATE: 81 BPM

## 2024-12-02 DIAGNOSIS — J20.9 ACUTE BRONCHITIS, UNSPECIFIED ORGANISM: Primary | ICD-10-CM

## 2024-12-02 DIAGNOSIS — R05.1 ACUTE COUGH: ICD-10-CM

## 2024-12-02 DIAGNOSIS — R00.1 BRADYCARDIA: ICD-10-CM

## 2024-12-02 LAB
ALBUMIN SERPL-MCNC: 3.7 G/DL (ref 3.4–5)
ALBUMIN/GLOB SERPL: 1.1 {RATIO} (ref 1.1–2.2)
ALP SERPL-CCNC: 103 U/L (ref 40–129)
ALT SERPL-CCNC: 39 U/L (ref 10–40)
ANION GAP SERPL CALCULATED.3IONS-SCNC: 10 MMOL/L (ref 3–16)
AST SERPL-CCNC: 39 U/L (ref 15–37)
BASOPHILS # BLD: 0.1 K/UL (ref 0–0.2)
BASOPHILS NFR BLD: 1.1 %
BILIRUB SERPL-MCNC: 0.6 MG/DL (ref 0–1)
BUN SERPL-MCNC: 15 MG/DL (ref 7–20)
CALCIUM SERPL-MCNC: 9.4 MG/DL (ref 8.3–10.6)
CHLORIDE SERPL-SCNC: 101 MMOL/L (ref 99–110)
CO2 SERPL-SCNC: 27 MMOL/L (ref 21–32)
CREAT SERPL-MCNC: 1.6 MG/DL (ref 0.8–1.3)
D-DIMER QUANTITATIVE: 0.28 UG/ML FEU (ref 0–0.6)
DEPRECATED RDW RBC AUTO: 13.8 % (ref 12.4–15.4)
EOSINOPHIL # BLD: 0.1 K/UL (ref 0–0.6)
EOSINOPHIL NFR BLD: 1.7 %
FLUAV RNA RESP QL NAA+PROBE: NOT DETECTED
FLUBV RNA RESP QL NAA+PROBE: NOT DETECTED
GFR SERPLBLD CREATININE-BSD FMLA CKD-EPI: 45 ML/MIN/{1.73_M2}
GLUCOSE SERPL-MCNC: 111 MG/DL (ref 70–99)
HCT VFR BLD AUTO: 50.7 % (ref 40.5–52.5)
HGB BLD-MCNC: 17.1 G/DL (ref 13.5–17.5)
INR PPP: 1.03 (ref 0.85–1.15)
LYMPHOCYTES # BLD: 1.7 K/UL (ref 1–5.1)
LYMPHOCYTES NFR BLD: 26.8 %
MCH RBC QN AUTO: 30.9 PG (ref 26–34)
MCHC RBC AUTO-ENTMCNC: 33.7 G/DL (ref 31–36)
MCV RBC AUTO: 91.6 FL (ref 80–100)
MONOCYTES # BLD: 0.6 K/UL (ref 0–1.3)
MONOCYTES NFR BLD: 10.2 %
NEUTROPHILS # BLD: 3.7 K/UL (ref 1.7–7.7)
NEUTROPHILS NFR BLD: 60.2 %
NT-PROBNP SERPL-MCNC: <36 PG/ML (ref 0–124)
PLATELET # BLD AUTO: 137 K/UL (ref 135–450)
PMV BLD AUTO: 8.5 FL (ref 5–10.5)
POTASSIUM SERPL-SCNC: 3.8 MMOL/L (ref 3.5–5.1)
PROT SERPL-MCNC: 7.2 G/DL (ref 6.4–8.2)
PROTHROMBIN TIME: 13.7 SEC (ref 11.9–14.9)
RBC # BLD AUTO: 5.53 M/UL (ref 4.2–5.9)
SARS-COV-2 RNA RESP QL NAA+PROBE: NOT DETECTED
SODIUM SERPL-SCNC: 138 MMOL/L (ref 136–145)
TROPONIN, HIGH SENSITIVITY: 10 NG/L (ref 0–22)
TROPONIN, HIGH SENSITIVITY: 17 NG/L (ref 0–22)
WBC # BLD AUTO: 6.2 K/UL (ref 4–11)

## 2024-12-02 PROCEDURE — 87636 SARSCOV2 & INF A&B AMP PRB: CPT

## 2024-12-02 PROCEDURE — 85025 COMPLETE CBC W/AUTO DIFF WBC: CPT

## 2024-12-02 PROCEDURE — 99285 EMERGENCY DEPT VISIT HI MDM: CPT

## 2024-12-02 PROCEDURE — 80053 COMPREHEN METABOLIC PANEL: CPT

## 2024-12-02 PROCEDURE — 84484 ASSAY OF TROPONIN QUANT: CPT

## 2024-12-02 PROCEDURE — 71046 X-RAY EXAM CHEST 2 VIEWS: CPT

## 2024-12-02 PROCEDURE — 83880 ASSAY OF NATRIURETIC PEPTIDE: CPT

## 2024-12-02 PROCEDURE — 93005 ELECTROCARDIOGRAM TRACING: CPT | Performed by: EMERGENCY MEDICINE

## 2024-12-02 PROCEDURE — 85379 FIBRIN DEGRADATION QUANT: CPT

## 2024-12-02 PROCEDURE — 85610 PROTHROMBIN TIME: CPT

## 2024-12-02 RX ORDER — BENZONATATE 100 MG/1
100 CAPSULE ORAL
Qty: 30 CAPSULE | Refills: 0 | Status: SHIPPED | OUTPATIENT
Start: 2024-12-02 | End: 2024-12-12

## 2024-12-02 RX ORDER — ALBUTEROL SULFATE 90 UG/1
2 INHALANT RESPIRATORY (INHALATION) 4 TIMES DAILY PRN
Qty: 54 G | Refills: 1 | Status: SHIPPED | OUTPATIENT
Start: 2024-12-02

## 2024-12-02 RX ORDER — PREDNISONE 20 MG/1
20 TABLET ORAL 2 TIMES DAILY
Qty: 10 TABLET | Refills: 0 | Status: SHIPPED | OUTPATIENT
Start: 2024-12-02 | End: 2024-12-07

## 2024-12-02 RX ORDER — DOXYCYCLINE HYCLATE 100 MG
100 TABLET ORAL 2 TIMES DAILY
Qty: 14 TABLET | Refills: 0 | Status: SHIPPED | OUTPATIENT
Start: 2024-12-02 | End: 2024-12-09

## 2024-12-02 NOTE — DISCHARGE INSTRUCTIONS
Please return to the emergency department there are new or worsening symptoms including but not limited to: Chest pain, worsening shortness of breath, passing out, fever.  Please follow-up with your primary care physician within the next 3 to 5 days to ensure your symptoms are improving.

## 2024-12-02 NOTE — ED PROVIDER NOTES
135 - 450 K/uL    MPV 8.5 5.0 - 10.5 fL    Neutrophils % 60.2 %    Lymphocytes % 26.8 %    Monocytes % 10.2 %    Eosinophils % 1.7 %    Basophils % 1.1 %    Neutrophils Absolute 3.7 1.7 - 7.7 K/uL    Lymphocytes Absolute 1.7 1.0 - 5.1 K/uL    Monocytes Absolute 0.6 0.0 - 1.3 K/uL    Eosinophils Absolute 0.1 0.0 - 0.6 K/uL    Basophils Absolute 0.1 0.0 - 0.2 K/uL   Comprehensive Metabolic Panel w/ Reflex to MG   Result Value Ref Range    Sodium 138 136 - 145 mmol/L    Potassium reflex Magnesium 3.8 3.5 - 5.1 mmol/L    Chloride 101 99 - 110 mmol/L    CO2 27 21 - 32 mmol/L    Anion Gap 10 3 - 16    Glucose 111 (H) 70 - 99 mg/dL    BUN 15 7 - 20 mg/dL    Creatinine 1.6 (H) 0.8 - 1.3 mg/dL    Est, Glom Filt Rate 45 (A) >60    Calcium 9.4 8.3 - 10.6 mg/dL    Total Protein 7.2 6.4 - 8.2 g/dL    Albumin 3.7 3.4 - 5.0 g/dL    Albumin/Globulin Ratio 1.1 1.1 - 2.2    Total Bilirubin 0.6 0.0 - 1.0 mg/dL    Alkaline Phosphatase 103 40 - 129 U/L    ALT 39 10 - 40 U/L    AST 39 (H) 15 - 37 U/L   Brain Natriuretic Peptide   Result Value Ref Range    NT Pro-BNP <36 0 - 124 pg/mL   Troponin   Result Value Ref Range    Troponin, High Sensitivity 17 0 - 22 ng/L   Protime-INR   Result Value Ref Range    Protime 13.7 11.9 - 14.9 sec    INR 1.03 0.85 - 1.15   D-Dimer, Quantitative   Result Value Ref Range    D-Dimer, Quant 0.28 0.00 - 0.60 ug/mL FEU   Troponin   Result Value Ref Range    Troponin, High Sensitivity 10 0 - 22 ng/L   EKG 12 Lead (SOB)   Result Value Ref Range    Ventricular Rate 48 BPM    Atrial Rate 48 BPM    P-R Interval 152 ms    QRS Duration 94 ms    Q-T Interval 420 ms    QTc Calculation (Bazett) 375 ms    P Axis 54 degrees    R Axis 75 degrees    T Axis 47 degrees    Diagnosis Sinus bradycardiaOtherwise normal ECG        SEP-1  Is this patient to be included in the SEP-1 Core Measure due to severe sepsis or septic shock?   No Exclusion criteria - the patient is NOT to be included for SEP-1 Core Measure due to: 2+ SIRS

## 2024-12-03 ENCOUNTER — TELEPHONE (OUTPATIENT)
Dept: CARDIOLOGY CLINIC | Age: 74
End: 2024-12-03

## 2024-12-03 LAB
EKG ATRIAL RATE: 48 BPM
EKG DIAGNOSIS: NORMAL
EKG P AXIS: 54 DEGREES
EKG P-R INTERVAL: 152 MS
EKG Q-T INTERVAL: 420 MS
EKG QRS DURATION: 94 MS
EKG QTC CALCULATION (BAZETT): 375 MS
EKG R AXIS: 75 DEGREES
EKG T AXIS: 47 DEGREES
EKG VENTRICULAR RATE: 48 BPM

## 2024-12-03 PROCEDURE — 93010 ELECTROCARDIOGRAM REPORT: CPT | Performed by: INTERNAL MEDICINE

## 2024-12-03 NOTE — TELEPHONE ENCOUNTER
Spoke to pt about New Patient referral without successful scheduling.  Pt sees [facility] cardiology and just had an appt in August.  He didn't know the ED referred him to [facility].  He is going to stay w/[facility] for now.  If he decides to switch, he will call to schedule.

## 2025-01-15 ENCOUNTER — HOSPITAL ENCOUNTER (OUTPATIENT)
Dept: CT IMAGING | Age: 75
Discharge: HOME | End: 2025-01-15
Attending: INTERNAL MEDICINE
Payer: MEDICARE

## 2025-01-15 DIAGNOSIS — R91.1 PULMONARY NODULE: ICD-10-CM

## 2025-01-15 PROCEDURE — 71250 CT THORAX DX C-: CPT

## 2025-01-20 ENCOUNTER — OFFICE VISIT (OUTPATIENT)
Dept: PULMONOLOGY | Age: 75
End: 2025-01-20
Payer: MEDICARE

## 2025-01-20 VITALS
OXYGEN SATURATION: 97 % | WEIGHT: 212.2 LBS | HEART RATE: 61 BPM | SYSTOLIC BLOOD PRESSURE: 128 MMHG | HEIGHT: 75 IN | DIASTOLIC BLOOD PRESSURE: 70 MMHG | BODY MASS INDEX: 26.38 KG/M2

## 2025-01-20 DIAGNOSIS — R91.1 PULMONARY NODULE: Primary | ICD-10-CM

## 2025-01-20 PROCEDURE — 3017F COLORECTAL CA SCREEN DOC REV: CPT | Performed by: INTERNAL MEDICINE

## 2025-01-20 PROCEDURE — 99213 OFFICE O/P EST LOW 20 MIN: CPT | Performed by: INTERNAL MEDICINE

## 2025-01-20 PROCEDURE — 3078F DIAST BP <80 MM HG: CPT | Performed by: INTERNAL MEDICINE

## 2025-01-20 PROCEDURE — 1159F MED LIST DOCD IN RCRD: CPT | Performed by: INTERNAL MEDICINE

## 2025-01-20 PROCEDURE — G8427 DOCREV CUR MEDS BY ELIG CLIN: HCPCS | Performed by: INTERNAL MEDICINE

## 2025-01-20 PROCEDURE — 1123F ACP DISCUSS/DSCN MKR DOCD: CPT | Performed by: INTERNAL MEDICINE

## 2025-01-20 PROCEDURE — 1160F RVW MEDS BY RX/DR IN RCRD: CPT | Performed by: INTERNAL MEDICINE

## 2025-01-20 PROCEDURE — 3074F SYST BP LT 130 MM HG: CPT | Performed by: INTERNAL MEDICINE

## 2025-01-20 PROCEDURE — 1036F TOBACCO NON-USER: CPT | Performed by: INTERNAL MEDICINE

## 2025-01-20 PROCEDURE — G8417 CALC BMI ABV UP PARAM F/U: HCPCS | Performed by: INTERNAL MEDICINE

## 2025-01-20 NOTE — PROGRESS NOTES
[facility]  Consult Note  Usman Joseph MD       Oseas Muniz   YOB: 1950    Date of Visit:  1/20/2025    Assessment/Plan:  1.  Pulmonary nodule  I reviewed CT images with the patient today.  He does have a density of the right lower lobe.  This was stable from the interval of April to July.    Repeat imaging in January 2025 shows the area of density persists but is perhaps a little bit less dense than it was previously.  Size wise, it is about the same.  Most likely this is an area of inflammation related to his pneumonia.    We discussed options.  Plan to repeat CT imaging in 1 year.    Follow-up here after his next CT scan..        Chief Complaint   Patient presents with    Follow-up     Results of CT chest       HPI  The patient returns today for follow-up of pulmonary nodule.  We have been following abnormal CT scan of the chest since April 2024.  At this time CT imaging revealed an area of density in the right lower lobe.  He was also being treated for pneumonia at that time.  Repeat CT scan in July was stable.  He presents today for 6-month follow-up.    He still has some difficulty with cough and mucus.  He did have PFT with methacholine which was normal.  He states he is going to see ENT because he thinks it may be coming from his sinuses.    Review of Systems  No Chest pain, Nausea or vomiting reported      History  I have reviewed past medical, surgical, social and family history. This is documented elsewhere in themedical record.     Physical Exam:  Well developed, well nourished  Alert and oriented  Sclera is clear  No cervical adenopathy  No JVD.  Chest examination is clear.  Cardiac examination reveals regular rate and rhythm without murmur, gallop or rub.  The abdomen is soft, nontender and nondistended.   There is no clubbing, cyanosis or edema of the extremities.  There is no obvious skin rash.  No focal neuro deficicts  Normal

## 2025-01-22 ENCOUNTER — OFFICE VISIT (OUTPATIENT)
Dept: ENT CLINIC | Age: 75
End: 2025-01-22
Payer: MEDICARE

## 2025-01-22 VITALS
DIASTOLIC BLOOD PRESSURE: 76 MMHG | BODY MASS INDEX: 26.61 KG/M2 | HEART RATE: 70 BPM | SYSTOLIC BLOOD PRESSURE: 138 MMHG | WEIGHT: 214 LBS | HEIGHT: 75 IN | OXYGEN SATURATION: 96 % | TEMPERATURE: 97.1 F

## 2025-01-22 DIAGNOSIS — R09.89 PHLEGM IN THROAT: Primary | ICD-10-CM

## 2025-01-22 DIAGNOSIS — K21.9 LPRD (LARYNGOPHARYNGEAL REFLUX DISEASE): ICD-10-CM

## 2025-01-22 PROCEDURE — G8417 CALC BMI ABV UP PARAM F/U: HCPCS | Performed by: OTOLARYNGOLOGY

## 2025-01-22 PROCEDURE — 3078F DIAST BP <80 MM HG: CPT | Performed by: OTOLARYNGOLOGY

## 2025-01-22 PROCEDURE — G8427 DOCREV CUR MEDS BY ELIG CLIN: HCPCS | Performed by: OTOLARYNGOLOGY

## 2025-01-22 PROCEDURE — 1123F ACP DISCUSS/DSCN MKR DOCD: CPT | Performed by: OTOLARYNGOLOGY

## 2025-01-22 PROCEDURE — 1159F MED LIST DOCD IN RCRD: CPT | Performed by: OTOLARYNGOLOGY

## 2025-01-22 PROCEDURE — 1126F AMNT PAIN NOTED NONE PRSNT: CPT | Performed by: OTOLARYNGOLOGY

## 2025-01-22 PROCEDURE — 3075F SYST BP GE 130 - 139MM HG: CPT | Performed by: OTOLARYNGOLOGY

## 2025-01-22 PROCEDURE — 3017F COLORECTAL CA SCREEN DOC REV: CPT | Performed by: OTOLARYNGOLOGY

## 2025-01-22 PROCEDURE — 1036F TOBACCO NON-USER: CPT | Performed by: OTOLARYNGOLOGY

## 2025-01-22 PROCEDURE — 99213 OFFICE O/P EST LOW 20 MIN: CPT | Performed by: OTOLARYNGOLOGY

## 2025-01-22 NOTE — PATIENT INSTRUCTIONS
Increase omeprazole to 40 mg twice daily on an empty stomach and eat a meal or snack 45 to 60 minutes later.

## 2025-01-22 NOTE — PROGRESS NOTES
CHIEF COMPLAINT  Chief Complaint   Patient presents with    Dysphagia    excessive mucus in throat       HISTORY OF PRESENT ILLNESS    Oseas Muniz is a 74 y.o. male who presented today for evaluation and management for a complaint of:  \"I am having a lot of mucous build up in my throat causing a lot of irritation when I try to cough it up. Also, I would like to know the results of the Ct scan of my sinuses\" and \"I continue to have problems with my throat. It fills like what I swallow is rubbing against the passage way in my throat. I continue to have a lot of mucous buildup. I am getting more hoarse. I have been on musinex  daily. Something is not right with what’s going on with my throat.\"  [Per patient telephone messages prior to this visits].    Patient stated that he was seen by NERY Mancilla.        REVIEW OF SYSTEMS  Constitutional:  Denied fever and chills.  ENT/sinus:  Denied otalgia, otorrhea, nasal pain, rhinorrhea, sore throat, and sinus/facial pain.        EXAMINATION    Vitals:    01/22/25 1419   BP: 138/76   Site: Right Upper Arm   Position: Sitting   Cuff Size: Large Adult   Pulse: 70   Temp: 97.1 °F (36.2 °C)   TempSrc: Infrared   SpO2: 96%   Weight: 97.1 kg (214 lb)   Height: 1.905 m (6' 3\")     WDWN, NAD  Face:  Normal skin.    Voice:  Normal, with no hoarseness, breathiness, or hot potato quality.  Ears:   TMs and EACs were normal.  The mastoids and pinnae were normal.    Nose:  Normal.    Sinuses: Nontender x 4   Throat,  OC/OP:  Normal.    Neck:  NT, No masses.  Trachea midline.    Nodes:  No lymphadenopathy.          I performed this physical exam personally.        IMPRESSION / DIAGNOSES / ORDERS:   Oseas was seen today for dysphagia and excessive mucus in throat.    Diagnoses and all orders for this visit:    Phlegm in throat    LPRD (laryngopharyngeal reflux disease)           RECOMMENDATIONS / PLAN:   Prescription drug management:  Increase to 40 mg omeprazole BID to attempt to

## 2025-01-24 ENCOUNTER — TELEPHONE (OUTPATIENT)
Dept: ENT CLINIC | Age: 75
End: 2025-01-24

## 2025-01-24 DIAGNOSIS — K21.9 LPRD (LARYNGOPHARYNGEAL REFLUX DISEASE): Primary | ICD-10-CM

## 2025-01-30 RX ORDER — OMEPRAZOLE 40 MG/1
CAPSULE, DELAYED RELEASE ORAL
Qty: 180 CAPSULE | Refills: 0 | Status: SHIPPED | OUTPATIENT
Start: 2025-01-30

## 2025-03-04 ENCOUNTER — APPOINTMENT (OUTPATIENT)
Dept: GENERAL RADIOLOGY | Age: 75
End: 2025-03-04
Payer: MEDICARE

## 2025-03-04 ENCOUNTER — HOSPITAL ENCOUNTER (EMERGENCY)
Age: 75
Discharge: HOME | End: 2025-03-04
Attending: EMERGENCY MEDICINE
Payer: MEDICARE

## 2025-03-04 VITALS
HEIGHT: 75 IN | DIASTOLIC BLOOD PRESSURE: 79 MMHG | BODY MASS INDEX: 26.24 KG/M2 | HEART RATE: 64 BPM | SYSTOLIC BLOOD PRESSURE: 183 MMHG | TEMPERATURE: 98.4 F | WEIGHT: 211 LBS | RESPIRATION RATE: 18 BRPM | OXYGEN SATURATION: 96 %

## 2025-03-04 DIAGNOSIS — J06.9 ACUTE UPPER RESPIRATORY INFECTION: Primary | ICD-10-CM

## 2025-03-04 LAB
FLUAV RNA RESP QL NAA+PROBE: NOT DETECTED
FLUBV RNA RESP QL NAA+PROBE: NOT DETECTED
SARS-COV-2 RNA RESP QL NAA+PROBE: NOT DETECTED

## 2025-03-04 PROCEDURE — 99284 EMERGENCY DEPT VISIT MOD MDM: CPT

## 2025-03-04 PROCEDURE — 87636 SARSCOV2 & INF A&B AMP PRB: CPT

## 2025-03-04 PROCEDURE — 71045 X-RAY EXAM CHEST 1 VIEW: CPT

## 2025-03-04 ASSESSMENT — PAIN DESCRIPTION - LOCATION: LOCATION: HEAD

## 2025-03-04 ASSESSMENT — PAIN SCALES - GENERAL: PAINLEVEL_OUTOF10: 6

## 2025-03-04 ASSESSMENT — PAIN - FUNCTIONAL ASSESSMENT: PAIN_FUNCTIONAL_ASSESSMENT: 0-10

## 2025-03-04 NOTE — DISCHARGE INSTRUCTIONS
You have an upper respiratory infection.  Take Tylenol as needed for pain.  Get plenty of rest and drink plenty of fluids.  You may take over-the-counter decongestions or cold medicine as needed.

## 2025-03-04 NOTE — ED PROVIDER NOTES
counseled the patient as to how to take these medications.  New Prescriptions    No medications on file       Patient instructed to follow up with:   Jhonny Aldana, APRN - CNP  [address]  [facility] [address]  [facility] [address] [phone]    In 2 days  As needed      All questions were answered and the patient/family expressed understanding and agreement with the plan.     PROCEDURES  None    CRITICAL CARE  N/A    CLINICAL IMPRESSION  1. Acute upper respiratory infection        DISPOSITION  Decision To Discharge 03/04/2025 06:44:53 AM     Clare Nunez MD    Note: This chart was created using voice recognition dictation software. Efforts were made by me to ensure accuracy, however some errors may be present due to limitations of this technology and occasionally words are not transcribed correctly.        Clare Nunez MD  03/04/25 0661

## 2025-04-23 DIAGNOSIS — K21.9 LPRD (LARYNGOPHARYNGEAL REFLUX DISEASE): ICD-10-CM

## 2025-04-28 RX ORDER — OMEPRAZOLE 40 MG/1
CAPSULE, DELAYED RELEASE ORAL
Qty: 180 CAPSULE | Refills: 0 | Status: SHIPPED | OUTPATIENT
Start: 2025-04-28

## 2025-04-28 NOTE — TELEPHONE ENCOUNTER
Will send a 3-month supply.  Patient should follow-up with Dr. Vega for further management of his LPRD.

## 2025-05-20 ENCOUNTER — OFFICE VISIT (OUTPATIENT)
Dept: ENT CLINIC | Age: 75
End: 2025-05-20
Payer: MEDICARE

## 2025-05-20 VITALS
DIASTOLIC BLOOD PRESSURE: 78 MMHG | HEIGHT: 75 IN | HEART RATE: 58 BPM | OXYGEN SATURATION: 97 % | WEIGHT: 208 LBS | TEMPERATURE: 97.5 F | SYSTOLIC BLOOD PRESSURE: 135 MMHG | BODY MASS INDEX: 25.86 KG/M2

## 2025-05-20 DIAGNOSIS — K21.9 LPRD (LARYNGOPHARYNGEAL REFLUX DISEASE): ICD-10-CM

## 2025-05-20 DIAGNOSIS — M54.2 NECK DISCOMFORT: Primary | ICD-10-CM

## 2025-05-20 DIAGNOSIS — R09.82 PND (POST-NASAL DRIP): ICD-10-CM

## 2025-05-20 PROCEDURE — 1036F TOBACCO NON-USER: CPT | Performed by: STUDENT IN AN ORGANIZED HEALTH CARE EDUCATION/TRAINING PROGRAM

## 2025-05-20 PROCEDURE — 1159F MED LIST DOCD IN RCRD: CPT | Performed by: STUDENT IN AN ORGANIZED HEALTH CARE EDUCATION/TRAINING PROGRAM

## 2025-05-20 PROCEDURE — 99213 OFFICE O/P EST LOW 20 MIN: CPT | Performed by: STUDENT IN AN ORGANIZED HEALTH CARE EDUCATION/TRAINING PROGRAM

## 2025-05-20 PROCEDURE — G8427 DOCREV CUR MEDS BY ELIG CLIN: HCPCS | Performed by: STUDENT IN AN ORGANIZED HEALTH CARE EDUCATION/TRAINING PROGRAM

## 2025-05-20 PROCEDURE — 1123F ACP DISCUSS/DSCN MKR DOCD: CPT | Performed by: STUDENT IN AN ORGANIZED HEALTH CARE EDUCATION/TRAINING PROGRAM

## 2025-05-20 PROCEDURE — 3017F COLORECTAL CA SCREEN DOC REV: CPT | Performed by: STUDENT IN AN ORGANIZED HEALTH CARE EDUCATION/TRAINING PROGRAM

## 2025-05-20 PROCEDURE — G8417 CALC BMI ABV UP PARAM F/U: HCPCS | Performed by: STUDENT IN AN ORGANIZED HEALTH CARE EDUCATION/TRAINING PROGRAM

## 2025-05-20 PROCEDURE — 3075F SYST BP GE 130 - 139MM HG: CPT | Performed by: STUDENT IN AN ORGANIZED HEALTH CARE EDUCATION/TRAINING PROGRAM

## 2025-05-20 PROCEDURE — 3078F DIAST BP <80 MM HG: CPT | Performed by: STUDENT IN AN ORGANIZED HEALTH CARE EDUCATION/TRAINING PROGRAM

## 2025-05-20 NOTE — PROGRESS NOTES
improve.      Dr. Benigno Vega, DO [facility]  Department of Otolaryngology/Head and Neck Surgery  5/20/25    Medical Decision Making:  The following items were considered in medical decision making:  Independent review of images  Review / order clinical lab tests  Review / order radiology tests  Decision to obtain old records      This note was generated completely or in part utilizing Dragon dictation speech recognition software.  Occasionally, words are mistranscribed and despite editing, the text may contain inaccuracies due to incorrect word recognition.  If further clarification is needed please contact the office at [phone].

## 2025-06-13 ENCOUNTER — OFFICE VISIT (OUTPATIENT)
Dept: ENT CLINIC | Age: 75
End: 2025-06-13
Payer: MEDICARE

## 2025-06-13 VITALS
OXYGEN SATURATION: 97 % | BODY MASS INDEX: 25.86 KG/M2 | HEART RATE: 53 BPM | TEMPERATURE: 97.5 F | SYSTOLIC BLOOD PRESSURE: 147 MMHG | WEIGHT: 208 LBS | HEIGHT: 75 IN | DIASTOLIC BLOOD PRESSURE: 79 MMHG

## 2025-06-13 DIAGNOSIS — R09.89 PHLEGM IN THROAT: ICD-10-CM

## 2025-06-13 DIAGNOSIS — R09.A2 GLOBUS PHARYNGEUS: Primary | ICD-10-CM

## 2025-06-13 DIAGNOSIS — K21.9 LPRD (LARYNGOPHARYNGEAL REFLUX DISEASE): ICD-10-CM

## 2025-06-13 DIAGNOSIS — R09.82 PND (POST-NASAL DRIP): ICD-10-CM

## 2025-06-13 PROCEDURE — 3017F COLORECTAL CA SCREEN DOC REV: CPT | Performed by: STUDENT IN AN ORGANIZED HEALTH CARE EDUCATION/TRAINING PROGRAM

## 2025-06-13 PROCEDURE — G8417 CALC BMI ABV UP PARAM F/U: HCPCS | Performed by: STUDENT IN AN ORGANIZED HEALTH CARE EDUCATION/TRAINING PROGRAM

## 2025-06-13 PROCEDURE — G8427 DOCREV CUR MEDS BY ELIG CLIN: HCPCS | Performed by: STUDENT IN AN ORGANIZED HEALTH CARE EDUCATION/TRAINING PROGRAM

## 2025-06-13 PROCEDURE — 1123F ACP DISCUSS/DSCN MKR DOCD: CPT | Performed by: STUDENT IN AN ORGANIZED HEALTH CARE EDUCATION/TRAINING PROGRAM

## 2025-06-13 PROCEDURE — 1159F MED LIST DOCD IN RCRD: CPT | Performed by: STUDENT IN AN ORGANIZED HEALTH CARE EDUCATION/TRAINING PROGRAM

## 2025-06-13 PROCEDURE — 3077F SYST BP >= 140 MM HG: CPT | Performed by: STUDENT IN AN ORGANIZED HEALTH CARE EDUCATION/TRAINING PROGRAM

## 2025-06-13 PROCEDURE — 3078F DIAST BP <80 MM HG: CPT | Performed by: STUDENT IN AN ORGANIZED HEALTH CARE EDUCATION/TRAINING PROGRAM

## 2025-06-13 PROCEDURE — 99213 OFFICE O/P EST LOW 20 MIN: CPT | Performed by: STUDENT IN AN ORGANIZED HEALTH CARE EDUCATION/TRAINING PROGRAM

## 2025-06-13 PROCEDURE — 31575 DIAGNOSTIC LARYNGOSCOPY: CPT | Performed by: STUDENT IN AN ORGANIZED HEALTH CARE EDUCATION/TRAINING PROGRAM

## 2025-06-13 PROCEDURE — 1036F TOBACCO NON-USER: CPT | Performed by: STUDENT IN AN ORGANIZED HEALTH CARE EDUCATION/TRAINING PROGRAM

## 2025-06-13 RX ORDER — GUAIFENESIN 600 MG/1
600 TABLET, EXTENDED RELEASE ORAL 2 TIMES DAILY
Qty: 30 TABLET | Refills: 0 | Status: SHIPPED | OUTPATIENT
Start: 2025-06-13 | End: 2025-06-28

## 2025-06-13 NOTE — PROGRESS NOTES
[facility]  DIVISION OF OTOLARYNGOLOGY- HEAD & NECK SURGERY  CLINIC FOLLOW-UP VISIT      Patient Name: Oseas Muniz  Medical Record Number:  [id number]  Primary Care Physician:  Jhonny Aldana APRN - CNP    ChiefComplaint     Chief Complaint   Patient presents with    Follow-up     Mucus in throat        History of Present Illness     Oseas Muniz is an 74 y.o. male previously seen for neck discomfort, postnasal drainage, LPRD.  Last seen 5/20/2025.    Interval History:   He still has the feeling of mucus in his throat.  Often times tries to cough it out but is unable to pee.  This seems to irritate his throat.  Sometimes causing discomfort when swallowing liquids, denies dysphagia.  Using Flonase daily.      Past Medical History     Past Medical History:   Diagnosis Date    CAD (coronary artery disease)     Cervical spondylosis 01/21/2020    Chronic renal insufficiency, stage 2 (mild) 11/17/2016    Head pain cephalgia 01/21/2014    Hyperlipidemia     Hypertension     Perennial allergic rhinitis 02/11/2015    Pre-diabetes        Past Surgical History     Past Surgical History:   Procedure Laterality Date    CARDIAC SURGERY      Stents    CHOLECYSTECTOMY      COLONOSCOPY      CORONARY ANGIOPLASTY WITH STENT PLACEMENT  2011    KNEE ARTHROSCOPY      LEFT    ROTATOR CUFF REPAIR      right, and left    TONSILLECTOMY      UPPER GASTROINTESTINAL ENDOSCOPY N/A 8/3/2022    ENDOSCOPIC ULTRASOUND EXAM performed by Omkar Hudson MD at [facility] ENDOSCOPY       Family History     Family History   Problem Relation Age of Onset    High Blood Pressure Mother     Emphysema Mother     High Blood Pressure Father     Cancer Father     No Known Problems Brother     No Known Problems Brother        Social History     Social History     Tobacco Use    Smoking status: Never     Passive exposure: Past    Smokeless tobacco: Never   Vaping Use    Vaping status: Never Used   Substance Use Topics    Alcohol use: No    Drug use:

## (undated) DEVICE — ENDOSCOPY KIT: Brand: MEDLINE INDUSTRIES, INC.

## (undated) DEVICE — BITE BLOCK ENDOSCP AD 60 FR W/ ADJ STRP PLAS GRN BLOX

## (undated) DEVICE — Device